# Patient Record
Sex: FEMALE | Race: WHITE
[De-identification: names, ages, dates, MRNs, and addresses within clinical notes are randomized per-mention and may not be internally consistent; named-entity substitution may affect disease eponyms.]

---

## 2017-07-02 ENCOUNTER — HOSPITAL ENCOUNTER (EMERGENCY)
Dept: HOSPITAL 47 - EC | Age: 77
Discharge: HOME | End: 2017-07-02
Payer: COMMERCIAL

## 2017-07-02 VITALS — SYSTOLIC BLOOD PRESSURE: 162 MMHG | RESPIRATION RATE: 18 BRPM | HEART RATE: 60 BPM | DIASTOLIC BLOOD PRESSURE: 71 MMHG

## 2017-07-02 VITALS — TEMPERATURE: 97.5 F

## 2017-07-02 DIAGNOSIS — Z88.1: ICD-10-CM

## 2017-07-02 DIAGNOSIS — I10: ICD-10-CM
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
## 2017-07-02 DIAGNOSIS — Z79.899: ICD-10-CM

## 2017-07-02 DIAGNOSIS — Z23: ICD-10-CM

## 2017-07-02 DIAGNOSIS — E78.5: ICD-10-CM
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
## 2017-07-02 DIAGNOSIS — S61.212A: Primary | ICD-10-CM

## 2017-07-02 PROCEDURE — 90715 TDAP VACCINE 7 YRS/> IM: CPT

## 2017-07-02 PROCEDURE — 90471 IMMUNIZATION ADMIN: CPT

## 2017-07-02 PROCEDURE — 99282 EMERGENCY DEPT VISIT SF MDM: CPT

## 2017-07-02 PROCEDURE — 12001 RPR S/N/AX/GEN/TRNK 2.5CM/<: CPT

## 2017-07-02 NOTE — ED
Wound/Laceration HPI





- General


Chief Complaint: Wound/Laceration


Stated Complaint: right middle finger laceration


Time Seen by Provider: 07/02/17 12:12


Source: patient


Mode of arrival: ambulatory


Limitations: no limitations





- History of Present Illness


Initial Comments: 





77-year-old female presents with right middle finger injury.  Patient states 

she was rolling up a car window and cut the tip of her finger stuck.  She is 

complaining of a laceration to finger.  The window did not break and there is 

no concern for foreign body.  Patient is uncertain of her tetanus status.  

Patient has no other complaints.





- Related Data


 Home Medications











 Medication  Instructions  Recorded  Confirmed


 


Atorvastatin Calcium [Atorvastatin 20 tab PO HS 03/21/16 07/02/17





Calcium]   


 


Cholecalciferol [Vitamin D3] 1,000 unit PO DAILY 03/21/16 07/02/17


 


Cranberry Fruit Extract [Cranberry] 500 mg PO DAILY 03/21/16 07/02/17


 


Enalapril Maleate [Enalapril 10 mg PO BID 03/21/16 07/02/17





Maleate]   


 


Metoprolol Succinate [Metoprolol 12.5 mg PO DAILY 03/21/16 07/02/17





Succinate]   


 


Multivitamins, Thera [Multivitamin] 1 tab PO DAILY 03/21/16 07/02/17


 


Vitamin B Complex 1 cap PO DAILY 03/21/16 07/02/17











 Allergies











Allergy/AdvReac Type Severity Reaction Status Date / Time


 


ciprofloxacin [From Cipro] Allergy  Rash/Hives Verified 07/02/17 12:27


 


ciprofloxacin HCl Allergy  Rash/Hives Verified 07/02/17 12:27





[From Cipro]     














Review of Systems


ROS Statement: 


Those systems with pertinent positive or pertinent negative responses have been 

documented in the HPI.





ROS Other: All systems not noted in ROS Statement are negative.





Past Medical History


Past Medical History: Hyperlipidemia, Hypertension


History of Any Multi-Drug Resistant Organisms: None Reported


Past Surgical History: Cholecystectomy, Hysterectomy, Orthopedic Surgery, 

Tonsillectomy


Past Psychological History: No Psychological Hx Reported


Smoking Status: Never smoker


Past Alcohol Use History: None Reported


Past Drug Use History: None Reported





General Exam


Limitations: no limitations


General appearance: alert, in no apparent distress


Head exam: Present: atraumatic, normocephalic


Eye exam: Present: normal appearance, PERRL


Respiratory exam: Present: normal lung sounds bilaterally, respiratory distress


Cardiovascular Exam: Present: regular rate, normal rhythm


GI/Abdominal exam: Present: soft.  Absent: distended, tenderness


Extremities exam: Present: other (Patient is a 1.5 cm laceration over the 

distal phalanx of the right third digit.  This is on the palmar surface.  No 

gross deformity.)





Course


 Vital Signs











  07/02/17 07/02/17





  11:41 13:15


 


Temperature 97.5 F L 97.5 F L


 


Pulse Rate 65 60


 


Respiratory 20 18





Rate  


 


Blood Pressure 155/70 162/71


 


O2 Sat by Pulse 98 100





Oximetry  














Procedures





- Laceration


  ** Laceration #1


Consent Obtained: verbal consent


Time Out Performed: Yes


Indication: laceration


Site: hand


Description: linear


Anesthetic Used: lidocaine 1%


Pre-repair: wound explored, irrigated extensively


Type of Sutures: nylon


Size of Sutures: 5-0


Technique: simple, interrupted


Patient Tolerated Procedure: well (4, 5-0 nylon sutures are placed.  Patient 

tolerated the procedure well.)





Medical Decision Making





- Medical Decision Making





69-year-old female presenting with right middle finger injury.  Patient does 

have a laceration over the distal phalanx on the palmar surface.  This is 

anesthetized, irrigated, and repaired with 5-0 nylon suture.  Patient is 

instructed to return with signs of infection.  She'll follow-up with her 

primary care physician for both evaluation and for suture removal in 

approximately 10 days.





Disposition


Clinical Impression: 


 Laceration





Disposition: HOME SELF-CARE


Condition: Good


Instructions:  Laceration (ED)


Additional Instructions: 


Patient will follow-up with primary care physician for suture removal in 

approximately 10 days.  Patient will return to emergency department with signs 

of infection.


Referrals: 


Mitchell Lovlel MD [Primary Care Provider] - 1-2 days


Time of Disposition: 13:15

## 2018-03-15 ENCOUNTER — HOSPITAL ENCOUNTER (OUTPATIENT)
Dept: HOSPITAL 47 - RADBDWWP | Age: 78
Discharge: HOME | End: 2018-03-15
Payer: MEDICARE

## 2018-03-15 DIAGNOSIS — Z78.0: Primary | ICD-10-CM

## 2018-03-15 DIAGNOSIS — M81.0: ICD-10-CM

## 2018-03-15 PROCEDURE — 77080 DXA BONE DENSITY AXIAL: CPT

## 2018-03-15 NOTE — BD
EXAMINATION TYPE: MG DEXA axial skeleton.  

 

DATE OF EXAM: 3/15/2018

 

 

CLINICAL HISTORY: 

 

Height:  65 inches

Weight:  193

 

FRAX RISK QUESTIONS:

Alcohol (3 or more units per day):  no

Family History (Parent hip fracture):  no

Glucocorticoids (More than 3mos):  no

           (Ex: prednisone, prednisolone, methylprednisolone, dexamethasone, and hydrocortisone).    
     

History of Fracture in Adulthood: no

Secondary Osteoporosis:

  1.  Type 1 Diabetes: no type II

  2.  Hyperthyroidism: no

  3.  Menopause before 45: hysterectomy age 32, but states menopause age 49

  4.  Malnutrition: unsure

  5.  Chronic liver disease: no

Rheumatoid Arthritis: no

Current Tobacco Use: no

 

RISK FACTORS 

HISTORY OF: 

Family History of Osteoporosis: yes

Active: somewhat

Diet low in dairy products/other sources of calcium:  no

Postmenopausal woman: yes

Take estrogen and/or progesterone medications: no

Lost more than 2 inches in height since high school: yes

Frequent falls: no, but slightly unsteady at times

Poor Health: somewhat

Hyperparathyroidism: no

Adrenal Insufficiency: no

 

MEDICATIONS: 

Prednisone or other steroids: no

Thyroid Medications: no 

Osteoporosis Medications: no

Additional Medications: oral diabetes meds,  blood pressure meds, multivitamin, Enalaprin, metoprolol
, Januvia, Pravastatin, Furosemide, aspirin, iron, B complex, glucosamine chondroitin 

 

 

Additional History: knee replacement; cervical CA

 

 

EXAM MEASUREMENTS: 

Bone mineral densitometry was performed using the [employer] System.

Bone mineral density as measured about the Lumbar spine is:  

----- L1-L4(G/cm2): 1.349

T Score Values are as follows:

----- L2: 1.3

----- L3: 1.7

----- L4: 0.6

----- L1-L4: 1.4

Bone mineral density has: Increased 6.8% since study of: 02/07/2013

 

Bone mineral density about the R hip (g/cm2): 0.684

Bone mineral density about the L hip (g/cm2): 0.683

T Score values are as follows:

-----R Neck: -2.5

-----L Neck: -2.6

-----R Total: -2.4

-----L Total: -2.0

Bone mineral density has: decreased  -4.7% since study of: 02/07/2013

 

 

IMPRESSION:

Osteoporosis (T Score less than -2.5).

 

There is increased fracture risk and therapy is usually indicated based on age.

 

Re-Screen 1-2 years.

 

 

 

 

 

NOTE:  T-SCORE=SD OF THE YOUNG ADULT MEAN.

## 2018-04-13 ENCOUNTER — HOSPITAL ENCOUNTER (OUTPATIENT)
Dept: HOSPITAL 47 - ORWHC2ENDO | Age: 78
End: 2018-04-13
Payer: COMMERCIAL

## 2018-04-13 VITALS — SYSTOLIC BLOOD PRESSURE: 135 MMHG | DIASTOLIC BLOOD PRESSURE: 62 MMHG | HEART RATE: 57 BPM | RESPIRATION RATE: 16 BRPM

## 2018-04-13 VITALS — TEMPERATURE: 97.5 F

## 2018-04-13 VITALS — BODY MASS INDEX: 31.9 KG/M2

## 2018-04-13 DIAGNOSIS — Z79.899: ICD-10-CM

## 2018-04-13 DIAGNOSIS — E11.9: ICD-10-CM

## 2018-04-13 DIAGNOSIS — Z79.82: ICD-10-CM

## 2018-04-13 DIAGNOSIS — K63.3: ICD-10-CM

## 2018-04-13 DIAGNOSIS — I10: ICD-10-CM

## 2018-04-13 DIAGNOSIS — C19: Primary | ICD-10-CM

## 2018-04-13 DIAGNOSIS — Z79.84: ICD-10-CM

## 2018-04-13 DIAGNOSIS — E78.5: ICD-10-CM

## 2018-04-13 DIAGNOSIS — Z88.1: ICD-10-CM

## 2018-04-13 LAB — GLUCOSE BLD-MCNC: 131 MG/DL (ref 75–99)

## 2018-04-13 PROCEDURE — 45380 COLONOSCOPY AND BIOPSY: CPT

## 2018-04-13 PROCEDURE — 88305 TISSUE EXAM BY PATHOLOGIST: CPT

## 2018-04-13 PROCEDURE — 45331 SIGMOIDOSCOPY AND BIOPSY: CPT

## 2018-04-13 NOTE — P.PCN
Date of Procedure: 04/13/18


Procedure(s) Performed: 


BRIEF HISTORY: Patient is a 78-year-old pleasant white female, scheduled for an 

elective colonoscopy as a part of intermittent rectal bleeding for the last 6 

months duration.  She also has prior history of colon polyps.





PROCEDURE PERFORMED: Attempted colonoscopy with biopsies  





PREOPERATIVE DIAGNOSIS: Rectal bleeding of 6 months duration. 





IV sedation per Anesthesia. 





PROCEDURE: After informed consent was obtained, the patient, was brought into 

the endoscopy unit. IV sedation was administered by Anesthesia under continuous 

monitoring.  Digital rectal examination was normal. Initially the Olympus CF-

160 flexible video colonoscope was then inserted in the rectum, gradually 

advanced into the  descending colon, and upon advancement was not possible 

because of extremely poor prep and extensive sigmoid diverticula cyst.  At this 

time the scope was gradually being withdrawn.  Most of the descending colon 

could not be visualized because of poor prep and solid stool in this area.  The 

sigmoid colon appeared normal.  In the rectosigmoid colon at 12 cm from the 

anal verge there was a semicircumferential circumferential ulcerated mass 

identified which was extending at least 5 cm proximally and multiple biopsies 

were done from this area.  The rectum appeared normal.  Retroflexion was 

performed in the rectum and no lesions were seen. The patient tolerated the 

procedure well. 





IMPRESSION: 


Ulcerated semicircumferential rectosigmoid mass extending from 12-17 cm from 

the anal verge status post multiple biopsies


Scope could not be advanced beyond the descending colon because of poor prep 

and solid stool in this area.


Sigmoid area to closest.





RECOMMENDATIONS:  Findings of this examination were discussed with the patient 

as well as a family.  At this time will await the biopsy results.  CT of the 

abdomen and pelvis will be scheduled and she'll be seen in the office next week.

## 2018-04-16 ENCOUNTER — HOSPITAL ENCOUNTER (OUTPATIENT)
Dept: HOSPITAL 47 - RADCTMAIN | Age: 78
Discharge: HOME | End: 2018-04-16
Payer: COMMERCIAL

## 2018-04-16 DIAGNOSIS — K57.30: ICD-10-CM

## 2018-04-16 DIAGNOSIS — K56.41: ICD-10-CM

## 2018-04-16 DIAGNOSIS — C18.7: Primary | ICD-10-CM

## 2018-04-16 LAB — BUN SERPL-SCNC: 25 MG/DL (ref 7–17)

## 2018-04-16 PROCEDURE — 82565 ASSAY OF CREATININE: CPT

## 2018-04-16 PROCEDURE — 74177 CT ABD & PELVIS W/CONTRAST: CPT

## 2018-04-16 PROCEDURE — 84520 ASSAY OF UREA NITROGEN: CPT

## 2018-04-16 NOTE — CT
EXAMINATION TYPE: CT abdomen pelvis w con

 

DATE OF EXAM: 4/16/2018

 

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 Symptoms of bleeding and
 constipation for 6 to 9 months per patient.

 

CT DLP: 1468.8mGycm  Automated Exposure Control for Dose Reduction was Utilized.

 

CONTRAST: 

CT scan of the abdomen and pelvis is performed with IV Contrast, patient injected with 80 mL of Isovu
e 300.

 

COMPARISON: CT abdomen and pelvis August 30, 2009.

 

FINDINGS:

 

LUNG BASES: There is 4 mm calcified nodule medial right lung base redemonstrated.

 

LIVER/GB: Cholecystectomy clips are again seen.

 

PANCREAS: No significant abnormality is seen.

 

SPLEEN: A 1.9 cm splenule in splenic hilum is slightly larger versus prior.

 

ADRENALS: Slight asymmetric nodular thickening to left adrenal gland is stable favoring benign.

 

KIDNEYS: No significant abnormality is seen.

 

BOWEL: The oral contrast does not reach colonic level making evaluation slightly suboptimal. There is
 no suspicious small or large bowel dilatation. There is some prominence of fecal material throughout
 the colon. There is prominent diverticulosis throughout the colon including throughout redundant sig
moid colon. There is suspicious moderate wall thickening in the distal sigmoid colon including sigmoi
d rectal junction seen best coronal image 66 likely reflecting site of recently diagnosed neoplasm. T
here is mild wall thickening of the right colon including at level of hepatic flexure in which a mild
 underlying colitis cannot be excluded.

 

UTERUS/ADNEXA: Uterus is surgically absent or less likely markedly atrophic unchanged from prior. Sma
ll amount of free fluid left pelvis axial image 66 is noted

 

LYMPH NODES: No greater than 1cm abdominal or pelvic lymph nodes are appreciated.

 

OSSEOUS STRUCTURES: Underlying levoconvex scoliosis is seen. There is moderate to severe multilevel a
nterior and lateral spurring. There is moderate to severe multilevel disc space narrowing and vacuum 
disc phenomenon in the upper to mid lumbar spine. There is moderate to severe axial joint space loss 
in both hips. There is prominent facet arthropathy lower lumbar levels.

 

OTHER: There is moderate calcified plaque of the aorta extending into branch vessels.

 

IMPRESSION: Suboptimal study, primary neoplasm sigmoid rectal junction is felt identified. No metasta
tic disease is evident. Prominent diffuse colonic diverticulosis is noted. There is mild to moderate 
diffuse colonic fecal stasis. There is possible mild right-sided colitis, correlate clinically.

## 2018-06-22 ENCOUNTER — HOSPITAL ENCOUNTER (INPATIENT)
Dept: HOSPITAL 47 - EC | Age: 78
LOS: 11 days | Discharge: NURSING, REHAB OR LONG-TERM CARE | DRG: 393 | End: 2018-07-03
Payer: MEDICARE

## 2018-06-22 VITALS — BODY MASS INDEX: 32.1 KG/M2

## 2018-06-22 DIAGNOSIS — Z79.899: ICD-10-CM

## 2018-06-22 DIAGNOSIS — E46: ICD-10-CM

## 2018-06-22 DIAGNOSIS — I25.10: ICD-10-CM

## 2018-06-22 DIAGNOSIS — Z96.651: ICD-10-CM

## 2018-06-22 DIAGNOSIS — R53.81: ICD-10-CM

## 2018-06-22 DIAGNOSIS — I35.0: ICD-10-CM

## 2018-06-22 DIAGNOSIS — Z82.49: ICD-10-CM

## 2018-06-22 DIAGNOSIS — E73.9: ICD-10-CM

## 2018-06-22 DIAGNOSIS — E87.2: ICD-10-CM

## 2018-06-22 DIAGNOSIS — C20: ICD-10-CM

## 2018-06-22 DIAGNOSIS — E87.8: ICD-10-CM

## 2018-06-22 DIAGNOSIS — Z90.710: ICD-10-CM

## 2018-06-22 DIAGNOSIS — I21.4: ICD-10-CM

## 2018-06-22 DIAGNOSIS — T45.1X5A: ICD-10-CM

## 2018-06-22 DIAGNOSIS — Z90.49: ICD-10-CM

## 2018-06-22 DIAGNOSIS — Z85.41: ICD-10-CM

## 2018-06-22 DIAGNOSIS — E66.9: ICD-10-CM

## 2018-06-22 DIAGNOSIS — I10: ICD-10-CM

## 2018-06-22 DIAGNOSIS — Z79.84: ICD-10-CM

## 2018-06-22 DIAGNOSIS — N39.0: ICD-10-CM
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
## 2018-06-22 DIAGNOSIS — D61.810: ICD-10-CM

## 2018-06-22 DIAGNOSIS — K52.1: Primary | ICD-10-CM

## 2018-06-22 DIAGNOSIS — E86.0: ICD-10-CM

## 2018-06-22 DIAGNOSIS — K12.32: ICD-10-CM

## 2018-06-22 DIAGNOSIS — Z88.1: ICD-10-CM

## 2018-06-22 DIAGNOSIS — L27.1: ICD-10-CM

## 2018-06-22 DIAGNOSIS — E11.9: ICD-10-CM

## 2018-06-22 DIAGNOSIS — I65.21: ICD-10-CM

## 2018-06-22 DIAGNOSIS — Z87.440: ICD-10-CM

## 2018-06-22 DIAGNOSIS — E78.5: ICD-10-CM

## 2018-06-22 DIAGNOSIS — M81.0: ICD-10-CM

## 2018-06-22 DIAGNOSIS — B96.1: ICD-10-CM

## 2018-06-22 LAB
ALBUMIN SERPL-MCNC: 3.4 G/DL (ref 3.5–5)
ALP SERPL-CCNC: 63 U/L (ref 38–126)
ALT SERPL-CCNC: 24 U/L (ref 9–52)
ANION GAP SERPL CALC-SCNC: 13 MMOL/L
APTT BLD: 20.8 SEC (ref 22–30)
AST SERPL-CCNC: 30 U/L (ref 14–36)
BASOPHILS # BLD AUTO: 0 K/UL (ref 0–0.2)
BASOPHILS NFR BLD AUTO: 0 %
BUN SERPL-SCNC: 30 MG/DL (ref 7–17)
CALCIUM SPEC-MCNC: 8.6 MG/DL (ref 8.4–10.2)
CHLORIDE SERPL-SCNC: 105 MMOL/L (ref 98–107)
CO2 SERPL-SCNC: 18 MMOL/L (ref 22–30)
EOSINOPHIL # BLD AUTO: 0.1 K/UL (ref 0–0.7)
EOSINOPHIL NFR BLD AUTO: 2 %
ERYTHROCYTE [DISTWIDTH] IN BLOOD BY AUTOMATED COUNT: 3.81 M/UL (ref 3.8–5.4)
ERYTHROCYTE [DISTWIDTH] IN BLOOD: 18.6 % (ref 11.5–15.5)
GLUCOSE BLD-MCNC: 114 MG/DL (ref 75–99)
GLUCOSE SERPL-MCNC: 159 MG/DL (ref 74–99)
HCT VFR BLD AUTO: 36.1 % (ref 34–46)
HGB BLD-MCNC: 11.4 GM/DL (ref 11.4–16)
INR PPP: 1 (ref ?–1.2)
LIPASE SERPL-CCNC: 145 U/L (ref 23–300)
LYMPHOCYTES # SPEC AUTO: 0.1 K/UL (ref 1–4.8)
LYMPHOCYTES NFR SPEC AUTO: 3 %
MCH RBC QN AUTO: 30.1 PG (ref 25–35)
MCHC RBC AUTO-ENTMCNC: 31.7 G/DL (ref 31–37)
MCV RBC AUTO: 94.8 FL (ref 80–100)
MONOCYTES # BLD AUTO: 0.2 K/UL (ref 0–1)
MONOCYTES NFR BLD AUTO: 5 %
NEUTROPHILS # BLD AUTO: 4.5 K/UL (ref 1.3–7.7)
NEUTROPHILS NFR BLD AUTO: 90 %
PLATELET # BLD AUTO: 175 K/UL (ref 150–450)
POTASSIUM SERPL-SCNC: 4.5 MMOL/L (ref 3.5–5.1)
PROT SERPL-MCNC: 5.7 G/DL (ref 6.3–8.2)
PT BLD: 9.9 SEC (ref 9–12)
SODIUM SERPL-SCNC: 136 MMOL/L (ref 137–145)
WBC # BLD AUTO: 4.9 K/UL (ref 3.8–10.6)

## 2018-06-22 PROCEDURE — 36415 COLL VENOUS BLD VENIPUNCTURE: CPT

## 2018-06-22 PROCEDURE — 87177 OVA AND PARASITES SMEARS: CPT

## 2018-06-22 PROCEDURE — 96374 THER/PROPH/DIAG INJ IV PUSH: CPT

## 2018-06-22 PROCEDURE — 74021 RADEX ABDOMEN 3+ VIEWS: CPT

## 2018-06-22 PROCEDURE — 85610 PROTHROMBIN TIME: CPT

## 2018-06-22 PROCEDURE — 82728 ASSAY OF FERRITIN: CPT

## 2018-06-22 PROCEDURE — 96361 HYDRATE IV INFUSION ADD-ON: CPT

## 2018-06-22 PROCEDURE — 83690 ASSAY OF LIPASE: CPT

## 2018-06-22 PROCEDURE — 87186 SC STD MICRODIL/AGAR DIL: CPT

## 2018-06-22 PROCEDURE — 87329 GIARDIA AG IA: CPT

## 2018-06-22 PROCEDURE — 83630 LACTOFERRIN FECAL (QUAL): CPT

## 2018-06-22 PROCEDURE — 71046 X-RAY EXAM CHEST 2 VIEWS: CPT

## 2018-06-22 PROCEDURE — 83605 ASSAY OF LACTIC ACID: CPT

## 2018-06-22 PROCEDURE — 83036 HEMOGLOBIN GLYCOSYLATED A1C: CPT

## 2018-06-22 PROCEDURE — 80074 ACUTE HEPATITIS PANEL: CPT

## 2018-06-22 PROCEDURE — 87045 FECES CULTURE AEROBIC BACT: CPT

## 2018-06-22 PROCEDURE — 84484 ASSAY OF TROPONIN QUANT: CPT

## 2018-06-22 PROCEDURE — 83735 ASSAY OF MAGNESIUM: CPT

## 2018-06-22 PROCEDURE — 87209 SMEAR COMPLEX STAIN: CPT

## 2018-06-22 PROCEDURE — 83550 IRON BINDING TEST: CPT

## 2018-06-22 PROCEDURE — 82553 CREATINE MB FRACTION: CPT

## 2018-06-22 PROCEDURE — 85730 THROMBOPLASTIN TIME PARTIAL: CPT

## 2018-06-22 PROCEDURE — 93880 EXTRACRANIAL BILAT STUDY: CPT

## 2018-06-22 PROCEDURE — 87086 URINE CULTURE/COLONY COUNT: CPT

## 2018-06-22 PROCEDURE — 80048 BASIC METABOLIC PNL TOTAL CA: CPT

## 2018-06-22 PROCEDURE — 87046 STOOL CULTR AEROBIC BACT EA: CPT

## 2018-06-22 PROCEDURE — 87207 SMEAR SPECIAL STAIN: CPT

## 2018-06-22 PROCEDURE — 81001 URINALYSIS AUTO W/SCOPE: CPT

## 2018-06-22 PROCEDURE — 87077 CULTURE AEROBIC IDENTIFY: CPT

## 2018-06-22 PROCEDURE — 83540 ASSAY OF IRON: CPT

## 2018-06-22 PROCEDURE — 80053 COMPREHEN METABOLIC PANEL: CPT

## 2018-06-22 PROCEDURE — 83880 ASSAY OF NATRIURETIC PEPTIDE: CPT

## 2018-06-22 PROCEDURE — 85025 COMPLETE CBC W/AUTO DIFF WBC: CPT

## 2018-06-22 PROCEDURE — 74176 CT ABD & PELVIS W/O CONTRAST: CPT

## 2018-06-22 PROCEDURE — 85045 AUTOMATED RETICULOCYTE COUNT: CPT

## 2018-06-22 PROCEDURE — 74022 RADEX COMPL AQT ABD SERIES: CPT

## 2018-06-22 PROCEDURE — 87324 CLOSTRIDIUM AG IA: CPT

## 2018-06-22 PROCEDURE — 93005 ELECTROCARDIOGRAM TRACING: CPT

## 2018-06-22 PROCEDURE — 99285 EMERGENCY DEPT VISIT HI MDM: CPT

## 2018-06-22 PROCEDURE — 82550 ASSAY OF CK (CPK): CPT

## 2018-06-22 RX ADMIN — CEFAZOLIN SCH MLS/HR: 330 INJECTION, POWDER, FOR SOLUTION INTRAMUSCULAR; INTRAVENOUS at 22:50

## 2018-06-22 RX ADMIN — ACETAMINOPHEN SCH ML: 160 SOLUTION ORAL at 22:50

## 2018-06-22 RX ADMIN — PRAVASTATIN SODIUM SCH MG: 40 TABLET ORAL at 22:50

## 2018-06-22 RX ADMIN — NITROFURANTOIN (MONOHYDRATE/MACROCRYSTALS) SCH MG: 75; 25 CAPSULE ORAL at 22:50

## 2018-06-22 NOTE — US
EXAMINATION TYPE: US carotid duplex BILAT

 

DATE OF EXAM: 6/22/2018

 

COMPARISON: NONE

 

CLINICAL HISTORY: per Maulik ORTA. carotid stenosis, patient states she is followed every six months at 
Dr's office.

 

EXAM MEASUREMENTS: 

 

RIGHT:  Peak Systolic Velocity (PSV) cm/sec

----- Right CCA:  66.2  

----- Right ICA:  268.0**     

----- Right ECA:  199.6   

ICA/CCA ratio:  4.0**

    

 

RIGHT:  End Diastole cm/sec

----- Right CCA:  7.8   

----- Right ICA:  19.1      

----- Right ECA:  0.0     

 

LEFT:  Peak Systolic Velocity (PSV) cm/sec

----- Left CCA:  74.9  

----- Left ICA:  98.2   

----- Left ECA:  108.1  

ICA/CCA ratio:  1.3  

 

LEFT:  End Diastole cm/sec

----- Left CCA:  7.6  

----- Left ICA:  14.1   

----- Left ECA:  0.0 

 

VERTEBRALS (direction of flow):

Right Vertebral: Antegrade

Left Vertebral: Antegrade

 

Rhythm:  Arrhythmia

 

Elevated right ICA velocity causing elevated ratio. Extensive plaque noted.

 

 

 

IMPRESSION:  There is antegrade flow in the vertebral arteries.

 

The images and measurements suggest more than 80% stenosis in the right internal carotid artery and m
ore than 50% stenosis in the left internal carotid artery.   

 

 

Criteria for Assigning % of Stenosis / Diameter reduction

(Estimation based on the indirect measurements of the internal carotid artery velocities (ICA PSV).

1.  Normal (no stenosis)=ICA PSV < 125 cm/s: ratio < 2.0: ICA EDV<40 cm/s.

2. Less than 50% stenosis=ICA PSV < 125 cm/s: ratio < 2.0: ICA EDV<40 cm/s.

3.  50 to 69% stenosis=ICA PSV of 125 to 230 cm/s: ration 2.0 ? 4.0: ICA EDV  cm/s.

4.  Greater than 70% stenosis to near occlusion= ICA PSV > 230 cm/s: ratio > 4.0: ICA EDV > 100 cm/s.
 

5.  Near occlusion= ICA PSV velocities may be low or undetectable: variable ratio and ICA EDV.

6.  Total occlusion=unable to detect flow.

## 2018-06-22 NOTE — XR
EXAMINATION TYPE: XR chest 2V

 

DATE OF EXAM: 6/22/2018

 

COMPARISON: NONE

 

HISTORY: Chest pain

 

TECHNIQUE:  Frontal and lateral views of the chest are obtained.

 

FINDINGS:  There is no heart failure nor confluent pneumonic infiltrate. Costophrenic angles are twila
r. There are chest leads. Bony thorax is intact.

 

IMPRESSION:  No active cardiopulmonary disease. Normal heart.

## 2018-06-22 NOTE — HP
HISTORY AND PHYSICAL



DATE OF SERVICE:

06/22/2018



CHIEF COMPLAINTS:

Diarrhea, weakness and elevated troponin.



HISTORY OF PRESENT ILLNESS:

This 78-year-old woman with a past medical history of multiple medical problems,

including diabetes mellitus, hypertension, hyperlipidemia, being followed by Dr. Lovell in the outpatient setting, also has rectal cancer.  The patient is receiving

chemotherapy from Dr. Balderrama.  The patient complains of diarrhea and also complains of

weakness and tiredness. Patient came to [facility].  The patient also had a

carotid stenosis previously.  The patient was found to have dehydration as well.

Troponin is elevated at 0.039.  Patient is admitted for further evaluation and

treatment.  There is no history of chest pain per se.  The EKG is not available at this

time.



PAST MEDICAL HISTORY:

1. History of rectal cancer.

2. Diabetes mellitus.

3. Hypertension.

4. Hyperlipidemia.

5. Osteoporosis.

6. Cholecystectomy.



HOME MEDICATIONS:

1. Compazine 10 mg daily p.r.n.

2. Zofran 4 mg q.8 p.r.n.

3. Januvia 100 mg p.o. daily.

4. Pravachol 40 mg at bedtime.

5. Metoprolol 12.5 mg each morning.

6. Enalapril 10 mg p.o. b.i.d.

7. Macrobid 100 mg p.o. b.i.d.



ALLERGIES:

CIPRO.



FAMILY HISTORY:

No history of heart disease or strokes in the family.



SOCIAL HISTORY:

No history of smoking.  No history of alcohol intake.



REVIEW OF SYSTEMS:

ENT: No diminished hearing. No diminished vision.

CARDIOVASCULAR SYSTEM: No angina, palpitations.

RESPIRATORY SYSTEM: As mentioned earlier.

GI: No nausea, vomiting.

: No dysuria or retention.

NERVOUS SYSTEM: No numbness, weakness.

ALLERGY/IMMUNOLOGY: No asthma, hayfever.

MUSCULOSKELETAL: As mentioned earlier.

HEMATOLOGY/ONCOLOGY: As mentioned earlier.

ENDOCRINE: Diabetes.

CONSTITUTIONAL: As mentioned earlier.

DERMATOLOGY: Negative.

RHEUMATOLOGY: Negative.

PSYCHIATRY: As mentioned earlier.



PHYSICAL EXAMINATION:

Patient is alert and oriented x3. Pulse 71, blood pressure 150/67, respiration 18,

temperature 98.2, pulse ox 97% on 2 L.

HEENT: Conjunctivae normal. Oral mucosa dry.

NECK: No jugular venous distention. No carotid bruit. No lymph node enlargement.

CARDIOVASCULAR SYSTEM: S1, S2 muffled. No S3. No S4.

RESPIRATORY SYSTEM: Breath sounds diminished at the bases. No rhonchi. No crackles.

ABDOMEN: Soft. Mild diffuse discomfort. No guarding.  No rigidity.  No mass palpable.

LEGS: No edema. No swelling.

NERVOUS SYSTEM: Higher functions as mentioned earlier. Moves all 4 limbs. No focal

motor or sensory deficit.

LYMPHATICS: No lymph node palpable in neck, axillae or groin.

SKIN: No ulcer, rash, bleeding.



LABS:

CBC within normal limits. Sodium is 136. Glucose 159. Troponin 0.039.



ASSESSMENT:

1. Diarrhea, weakness, dehydration.

2. Troponin 0.039; possible acute non-ST-segment-elevation myocardial infarction.

3. Rectal cancer, on treatment.

4. Diabetes mellitus, type 2.

5. Hypertension.

6. Hyperlipidemia.

7. Osteoporosis.

8. History of cholecystectomy.

9. FULL CODE.



RECOMMENDATIONS AND DISCUSSION:

In this 78-year-old woman who presented with multiple complex medical issues, we will

monitor the patient closely.  I recommend continuing the current medications,

symptomatic treatment.  Otherwise, cautious IV fluids. Cardiology consultation.  Will

consult Dr. Balderrama also.  EKG.  Resume the home medications, including the beta

blockers. Antiplatelet agents. Guarded prognosis because of multiple complex medical

issues. Further recommendations to follow. We will hold off the heparin at this time

because of history of rectal cancer as well as diarrhea. Prognosis guarded.  Discussed

with the patient and family, who understand and agree. A copy of this dictation is

being forwarded to Dr. Lovell, who is the primary physician.





MMODL / IJN: [id number] / Job#: [id number]

## 2018-06-22 NOTE — ED
Nausea/Vomiting/Diarrhea HPI





- General


Chief complaint: Nausea/Vomiting/Diarrhea


Stated complaint: Cancer Pt


Time Seen by Provider: 06/22/18 15:45


Source: patient


Mode of arrival: wheelchair


Limitations: no limitations





- History of Present Illness


Initial comments: 


78-year-old  female with past medical history as noted below presented 

for evaluation of nausea vomiting diarrhea and rashes.  She states that she 

just started radiation for rectal cancer about a week ago and since then has 

been developing rash across her hands feet and in her mouth.  States the sores 

in her mouth are difficult to control making eating a problem.  She further 

states that she has been having nausea and vomiting over the last week but 

acutely worsening over the last 2-3 days.  She has also been having diarrhea 

which she believes is making her further dehydrated as she cannot keep anything 

down.  Denies any associated abdominal pain that is out of the ordinary for her

, chest pain, shortness breath, fevers, chills.  She has been following up with 

both her PCP and her oncologist but her symptoms continue to worsen.





- Related Data


 Home Medications











 Medication  Instructions  Recorded  Confirmed


 


Enalapril Maleate [Enalapril 10 mg PO BID 03/21/16 06/22/18





Maleate]   


 


Metoprolol Succinate [Metoprolol 12.5 mg PO QAM 03/21/16 06/22/18





Succinate]   


 


sitaGLIPtin [Januvia] 100 mg PO DAILY 03/20/18 06/22/18


 


Pravastatin Sodium [Pravachol] 40 mg PO HS 04/13/18 06/22/18


 


Nitrofurantoin Monohyd/M-Cryst 100 mg PO Q12HR 06/22/18 06/22/18





[Macrobid]   


 


Ondansetron [Zofran ODT] 4 mg PO Q8HR PRN 06/22/18 06/22/18


 


Prochlorperazine [Compazine] 10 mg PO DAILY PRN 06/22/18 06/22/18











 Allergies











Allergy/AdvReac Type Severity Reaction Status Date / Time


 


ciprofloxacin [From Cipro] AdvReac  Blackout Verified 06/22/18 16:17


 


ciprofloxacin HCl AdvReac  Blackout Verified 06/22/18 16:17





[From Cipro]     














Review of Systems


ROS Statement: 


Those systems with pertinent positive or pertinent negative responses have been 

documented in the HPI.





ROS Other: All systems not noted in ROS Statement are negative.


Constitutional: Denies: fever, chills, night sweats


Eyes: Denies: eye pain, eye discharge, vision change


ENT: Reports: other (Mouth sores).  Denies: ear pain, throat pain


Respiratory: Denies: cough, dyspnea


Cardiovascular: Denies: chest pain, palpitations


Endocrine: Reports: fatigue.  Denies: polydipsia, polyuria


Gastrointestinal: Reports: abdominal pain (At baseline), nausea, vomiting


Genitourinary: Denies: urgency, dysuria


Musculoskeletal: Denies: back pain, arthralgia, myalgia


Skin: Reports: lesions, change in color.  Denies: rash


Neurological: Denies: headache, weakness


Psychiatric: Denies: anxiety, depression


Hematological/Lymphatic: Denies: easy bleeding, easy bruising





Past Medical History


Past Medical History: Cancer, Diabetes Mellitus, Hyperlipidemia, Hypertension


Additional Past Medical History / Comment(s): osteoporosis, rectal cancer


History of Any Multi-Drug Resistant Organisms: None Reported


Past Surgical History: Cholecystectomy, Hysterectomy, Orthopedic Surgery, 

Tonsillectomy


Past Anesthesia/Blood Transfusion Reactions: No Reported Reaction


Past Psychological History: No Psychological Hx Reported


Smoking Status: Never smoker


Past Alcohol Use History: None Reported


Past Drug Use History: None Reported





- Past Family History


  ** Mother


Family Medical History: No Reported History





  ** Father


Family Medical History: Myocardial Infarction (MI)





General Exam


Limitations: no limitations


General appearance: alert, in no apparent distress


Head exam: Present: atraumatic, normocephalic


Eye exam: Present: normal appearance, PERRL, EOMI


ENT exam: Present: normal exam, normal oropharynx


Neck exam: Present: normal inspection, tenderness


Respiratory exam: Present: normal lung sounds bilaterally.  Absent: respiratory 

distress, wheezes, rales, rhonchi, stridor


Cardiovascular Exam: Present: regular rate, normal rhythm


GI/Abdominal exam: Present: soft.  Absent: distended, tenderness, guarding, 

rebound, rigid


Rectal exam: Present: deferred


Extremities exam: Present: normal inspection, full ROM


Back exam: Present: normal inspection, full ROM


Neurological exam: Present: alert, oriented X3


Psychiatric exam: Present: normal affect, normal mood


Skin exam: Present: warm, dry, intact, other (hands and feet are erythematous 

without lesions; oral cavity shows sores scattered across the mucosa)





Course


 Vital Signs











  06/22/18 06/22/18 06/22/18





  14:39 16:43 19:00


 


Temperature 97.9 F  


 


Pulse Rate 95 81 85


 


Respiratory 20 18 18





Rate   


 


Blood Pressure 113/66 180/79 146/71


 


O2 Sat by Pulse 100 95 100





Oximetry   














  06/22/18





  20:22


 


Temperature 98.2 F


 


Pulse Rate 71


 


Respiratory 18





Rate 


 


Blood Pressure 150/67


 


O2 Sat by Pulse 97





Oximetry 














Medical Decision Making





- Medical Decision Making





78-year-old  female past medical history as noted above presented for 

evaluation of intractable nausea and vomiting and discoloration/rash to hands 

and feet and mouth.  On physical examination she does have sores in the mouth 

that are not ulcerating or opening up.  The discoloration to the hands is 

distal to the ankles and wrists and there are no open lesions there either she 

just states that they are burning.  The remainder of her physical exam is 

benign.





Labs show an elevated troponin of 0.039 otherwise no significant abnormalities.

  EKG showed above and chest x-ray shows no acute process.  The patient was 

reevaluated and had some improvement in her symptoms.  She was informed of 

results and that she be admitted for further treatment and evaluation.  The 

patient was discussed with cardiology, medicine, and oncology who agreed with 

admission and had no further requests. Admission order placed and bed request 

submitted.





- Lab Data


Result diagrams: 


 06/22/18 16:33





 06/22/18 16:33


 Lab Results











  06/22/18 06/22/18 06/22/18 Range/Units





  16:33 16:33 16:33 


 


WBC  4.9    (3.8-10.6)  k/uL


 


RBC  3.81    (3.80-5.40)  m/uL


 


Hgb  11.4    (11.4-16.0)  gm/dL


 


Hct  36.1    (34.0-46.0)  %


 


MCV  94.8    (80.0-100.0)  fL


 


MCH  30.1    (25.0-35.0)  pg


 


MCHC  31.7    (31.0-37.0)  g/dL


 


RDW  18.6 H    (11.5-15.5)  %


 


Plt Count  175    (150-450)  k/uL


 


Neutrophils %  90    %


 


Lymphocytes %  3    %


 


Monocytes %  5    %


 


Eosinophils %  2    %


 


Basophils %  0    %


 


Neutrophils #  4.5    (1.3-7.7)  k/uL


 


Lymphocytes #  0.1 L    (1.0-4.8)  k/uL


 


Monocytes #  0.2    (0-1.0)  k/uL


 


Eosinophils #  0.1    (0-0.7)  k/uL


 


Basophils #  0.0    (0-0.2)  k/uL


 


Polychromasia  Present    


 


Anisocytosis  Slight    


 


Macrocytosis  Slight    


 


PT     (9.0-12.0)  sec


 


INR     (<1.2)  


 


APTT     (22.0-30.0)  sec


 


Sodium   136 L   (137-145)  mmol/L


 


Potassium   4.5   (3.5-5.1)  mmol/L


 


Chloride   105   ()  mmol/L


 


Carbon Dioxide   18 L   (22-30)  mmol/L


 


Anion Gap   13   mmol/L


 


BUN   30 H   (7-17)  mg/dL


 


Creatinine   0.90   (0.52-1.04)  mg/dL


 


Est GFR (CKD-EPI)AfAm   71   (>60 ml/min/1.73 sqM)  


 


Est GFR (CKD-EPI)NonAf   61   (>60 ml/min/1.73 sqM)  


 


Glucose   159 H   (74-99)  mg/dL


 


Plasma Lactic Acid Aron    1.0  (0.7-2.0)  mmol/L


 


Calcium   8.6   (8.4-10.2)  mg/dL


 


Total Bilirubin   0.3   (0.2-1.3)  mg/dL


 


AST   30   (14-36)  U/L


 


ALT   24   (9-52)  U/L


 


Alkaline Phosphatase   63   ()  U/L


 


Troponin I     (0.000-0.034)  ng/mL


 


NT-Pro-B Natriuret Pep     pg/mL


 


Total Protein   5.7 L   (6.3-8.2)  g/dL


 


Albumin   3.4 L   (3.5-5.0)  g/dL


 


Lipase   145   ()  U/L














  06/22/18 06/22/18 06/22/18 Range/Units





  16:33 16:33 16:33 


 


WBC     (3.8-10.6)  k/uL


 


RBC     (3.80-5.40)  m/uL


 


Hgb     (11.4-16.0)  gm/dL


 


Hct     (34.0-46.0)  %


 


MCV     (80.0-100.0)  fL


 


MCH     (25.0-35.0)  pg


 


MCHC     (31.0-37.0)  g/dL


 


RDW     (11.5-15.5)  %


 


Plt Count     (150-450)  k/uL


 


Neutrophils %     %


 


Lymphocytes %     %


 


Monocytes %     %


 


Eosinophils %     %


 


Basophils %     %


 


Neutrophils #     (1.3-7.7)  k/uL


 


Lymphocytes #     (1.0-4.8)  k/uL


 


Monocytes #     (0-1.0)  k/uL


 


Eosinophils #     (0-0.7)  k/uL


 


Basophils #     (0-0.2)  k/uL


 


Polychromasia     


 


Anisocytosis     


 


Macrocytosis     


 


PT   9.9   (9.0-12.0)  sec


 


INR   1.0   (<1.2)  


 


APTT   20.8 L   (22.0-30.0)  sec


 


Sodium     (137-145)  mmol/L


 


Potassium     (3.5-5.1)  mmol/L


 


Chloride     ()  mmol/L


 


Carbon Dioxide     (22-30)  mmol/L


 


Anion Gap     mmol/L


 


BUN     (7-17)  mg/dL


 


Creatinine     (0.52-1.04)  mg/dL


 


Est GFR (CKD-EPI)AfAm     (>60 ml/min/1.73 sqM)  


 


Est GFR (CKD-EPI)NonAf     (>60 ml/min/1.73 sqM)  


 


Glucose     (74-99)  mg/dL


 


Plasma Lactic Acid Aron     (0.7-2.0)  mmol/L


 


Calcium     (8.4-10.2)  mg/dL


 


Total Bilirubin     (0.2-1.3)  mg/dL


 


AST     (14-36)  U/L


 


ALT     (9-52)  U/L


 


Alkaline Phosphatase     ()  U/L


 


Troponin I    0.039 H*  (0.000-0.034)  ng/mL


 


NT-Pro-B Natriuret Pep  420    pg/mL


 


Total Protein     (6.3-8.2)  g/dL


 


Albumin     (3.5-5.0)  g/dL


 


Lipase     ()  U/L














06/22/18 21:31


Sinus rhythm with premature superventricular complex is, LAD, RBBB and a 

ventricular rate of 77





Disposition


Clinical Impression: 


 NSTEMI (non-ST elevated myocardial infarction), Nausea, Diarrhea, Mucositis 

due to radiation therapy





Disposition: ADMITTED AS IP TO THIS [facility]


Decision to Admit Reason: Admit from EC


Decision Time: 18:44

## 2018-06-23 LAB
ANION GAP SERPL CALC-SCNC: 13 MMOL/L
BASOPHILS # BLD AUTO: 0 K/UL (ref 0–0.2)
BASOPHILS NFR BLD AUTO: 0 %
BUN SERPL-SCNC: 30 MG/DL (ref 7–17)
CALCIUM SPEC-MCNC: 8 MG/DL (ref 8.4–10.2)
CHLORIDE SERPL-SCNC: 106 MMOL/L (ref 98–107)
CO2 SERPL-SCNC: 18 MMOL/L (ref 22–30)
EOSINOPHIL # BLD AUTO: 0.2 K/UL (ref 0–0.7)
EOSINOPHIL NFR BLD AUTO: 8 %
ERYTHROCYTE [DISTWIDTH] IN BLOOD BY AUTOMATED COUNT: 3.6 M/UL (ref 3.8–5.4)
ERYTHROCYTE [DISTWIDTH] IN BLOOD: 18.1 % (ref 11.5–15.5)
GLUCOSE BLD-MCNC: 133 MG/DL (ref 75–99)
GLUCOSE BLD-MCNC: 138 MG/DL (ref 75–99)
GLUCOSE BLD-MCNC: 153 MG/DL (ref 75–99)
GLUCOSE BLD-MCNC: 158 MG/DL (ref 75–99)
GLUCOSE SERPL-MCNC: 124 MG/DL (ref 74–99)
HCT VFR BLD AUTO: 34.2 % (ref 34–46)
HGB BLD-MCNC: 10.8 GM/DL (ref 11.4–16)
LYMPHOCYTES # SPEC AUTO: 0.2 K/UL (ref 1–4.8)
LYMPHOCYTES NFR SPEC AUTO: 8 %
MCH RBC QN AUTO: 30.1 PG (ref 25–35)
MCHC RBC AUTO-ENTMCNC: 31.6 G/DL (ref 31–37)
MCV RBC AUTO: 95.1 FL (ref 80–100)
MONOCYTES # BLD AUTO: 0.2 K/UL (ref 0–1)
MONOCYTES NFR BLD AUTO: 6 %
NEUTROPHILS # BLD AUTO: 2.3 K/UL (ref 1.3–7.7)
NEUTROPHILS NFR BLD AUTO: 76 %
PLATELET # BLD AUTO: 166 K/UL (ref 150–450)
POTASSIUM SERPL-SCNC: 3.9 MMOL/L (ref 3.5–5.1)
SODIUM SERPL-SCNC: 137 MMOL/L (ref 137–145)
WBC # BLD AUTO: 3.1 K/UL (ref 3.8–10.6)

## 2018-06-23 RX ADMIN — CEFAZOLIN SCH MLS/HR: 330 INJECTION, POWDER, FOR SOLUTION INTRAMUSCULAR; INTRAVENOUS at 08:23

## 2018-06-23 RX ADMIN — METOPROLOL SUCCINATE SCH MG: 25 TABLET, EXTENDED RELEASE ORAL at 08:46

## 2018-06-23 RX ADMIN — PANTOPRAZOLE SODIUM SCH MG: 40 TABLET, DELAYED RELEASE ORAL at 07:04

## 2018-06-23 RX ADMIN — ACETAMINOPHEN SCH ML: 160 SOLUTION ORAL at 21:07

## 2018-06-23 RX ADMIN — LISINOPRIL SCH MG: 20 TABLET ORAL at 13:16

## 2018-06-23 RX ADMIN — ASPIRIN 81 MG CHEWABLE TABLET SCH MG: 81 TABLET CHEWABLE at 13:30

## 2018-06-23 RX ADMIN — NITROFURANTOIN (MONOHYDRATE/MACROCRYSTALS) SCH MG: 75; 25 CAPSULE ORAL at 08:46

## 2018-06-23 RX ADMIN — ISOSORBIDE MONONITRATE SCH MG: 30 TABLET, EXTENDED RELEASE ORAL at 09:39

## 2018-06-23 RX ADMIN — ASPIRIN 81 MG CHEWABLE TABLET SCH MG: 81 TABLET CHEWABLE at 08:46

## 2018-06-23 RX ADMIN — NITROFURANTOIN (MONOHYDRATE/MACROCRYSTALS) SCH MG: 75; 25 CAPSULE ORAL at 20:10

## 2018-06-23 RX ADMIN — LINAGLIPTIN SCH MG: 5 TABLET, FILM COATED ORAL at 08:46

## 2018-06-23 RX ADMIN — ACETAMINOPHEN SCH ML: 160 SOLUTION ORAL at 16:56

## 2018-06-23 RX ADMIN — ONDANSETRON PRN MG: 2 INJECTION INTRAMUSCULAR; INTRAVENOUS at 13:15

## 2018-06-23 RX ADMIN — PRAVASTATIN SODIUM SCH MG: 40 TABLET ORAL at 20:10

## 2018-06-23 RX ADMIN — ACETAMINOPHEN SCH ML: 160 SOLUTION ORAL at 08:45

## 2018-06-23 NOTE — CONS
CONSULTATION



This is a 78-year-old female she has been admitted with history of dehydration and

diarrhea.  The patient has history of rectal cancer.  She has been on radiation under

care of Oncology.  The patient had a carotid ultrasound which showed right side 80% and

left side is 50% stenosis.  No history of CAD.  No history of TIA or amaurosis fugax.



MEDICAL HISTORY:

Includes:

1. History of rectal cancer.

2. Diabetes mellitus.

3. Hypertension.

4. Hyperlipidemia.



SURGICAL HISTORY:

Patient had a CT of the rectum for that patient had surgery and chemotherapy.



EXAMINATION:

Patient was seen in her room patient lying comfortably.  NEURO: Motor function normal

upper and lower extremity.



The patient had a carotid ultrasound which showed her right side 80% and left side 50%.

At this point, the patient is stable from Vascular point of view.  When patient needs

_____to we can follow in the office.  The patient does not need any _____ at this

point.





MMODL / IJN: [id number] / Job#: [id number]

## 2018-06-23 NOTE — CONS
CONSULTATION



Mrs. Everett is a 78-year-old female who has rectal cancer, who came in with

complaining of nausea and vomiting.  Later, she complained of some discomfort in the

chest and some shortness of breath and then cardiology was consulted on account of

mildly abnormal troponins.  At this time, she is pain-free.  She does not have any

chest discomfort.  She does not appear to be short of breath.



PAST HISTORY:

Of rectal cancer on radiation therapy at this time, diabetes type 2, hypertension,

dyslipidemia, osteoporosis.



PAST SURGICAL HISTORY:

Cholecystectomy.



MEDICATIONS:

Home medications include: Compazine, Zofran, Januvia, Pravachol, metoprolol, enalapril,

Macrobid, and aspirin.



ALLERGIES:

TO CIPROFLOXACIN.



FAMILY HISTORY:

Noncontributory.



SOCIAL HISTORY:

No history of smoking or alcohol use.



REVIEW OF SYSTEMS:

No fever, chills, or rigors.  No cough or expectation.  She complains of shortness of

breath, mild atypical left-sided chest discomfort.  She complained of nausea, vomiting.

No diarrhea.  No hematuria or dysuria.  No strokes or seizures.



PHYSICAL EXAMINATION:

On examination, her blood pressure this morning is 124/61 mmHg, pulse rate in the 80s

to 90s.  Afebrile, 97.2 degrees Fahrenheit.  Breath sounds are reduced bilaterally.

Heart sounds are normal and regular.

Head and neck examination is normal.

ABDOMEN:  Soft.

Heart sounds are normal.  No murmurs or gallops.



LABS:

Reviewed.  White count 3.1, hemoglobin is 10.8.  Electrolytes normal.  Troponin 0.05

and 0.05.  A 12-lead ECG shows sinus rhythm with right bundle branch block.  Left

anterior fascicular block.  Normal OH intervals.  No definite ST-segment abnormalities.



IMPRESSION:

1. Chest discomfort with very borderline troponins.

2. Hypertension.

3. Diabetes type 2.

4. Being treated for rectal cancer at this time, admitted with nausea and vomiting.



SUGGEST:

Medical treatment for likely underlying coronary artery disease.  She is on statins.

Continue aspirin and add Imdur 30 mg p.o. daily.  She will follow up with primary

cardiologist upon discharge.





MMODL / IJN: [id number] / Job#: [id number]

## 2018-06-23 NOTE — CONS
CONSULTATION



DATE OF SERVICE:

2018



REASON FOR CONSULTATION:

Rectal cancer.



REASON FOR ADMISSION:

Progressive weakness.



Edna is a very pleasant 78-year-old  lady, very well known to me, who was

diagnosed with rectal carcinoma on 2018 when she presented with intermittent

diarrhea and hematochezia of 3-6 months' duration.  She underwent a colonoscopy on

2018 by Dr. Brittany Horta; in fact, it was an attempted colonoscopy. It was not

completed due to poor prep; however, she was found to have an ulcerated semi-

circumferential mass at 17 cm from the anal verge and scope could not be passed beyond

that mass.  Biopsy of that mass was positive for invasive adenocarcinoma. She had a CT

scan of the chest, abdomen and pelvis which revealed thickening at the distal sigmoid

but otherwise was negative for metastatic disease.  Subsequently she had an MRI of the

pelvis which revealed thickening of the rectal wall and large regional nodes

bilaterally. The perirectal fat tissue appeared uninvolved.  Subsequently she was

started on neoadjuvant chemoradiation therapy with oral Xeloda concurrently with

radiation therapy. She has completed about 3 weeks of treatment so far.  However, she

presented to the emergency department yesterday with progressive weakness, diarrhea,

mostly watery, and also nausea and vomiting.  She was found to be dehydrated and she

ended up being admitted to the hospital.  She was started on IV fluid. Her troponin was

slightly elevated and she was seen by a cardiologist and it was felt to be nonspecific.

Today she feels a little better.  She is still having issues with diarrhea, mostly

watery, with multiple bowel movements, and she is somewhat nauseated.  She complains of

dryness in her mouth.  She has lost a few pounds over the last week.  Her blood sugar

has been running between 120 and 180 at home. She denies any fever or dysphagia.  No

melena, hematochezia, hematuria, hemoptysis, hematemesis or epistaxis, but overall she

is feeling weak.



PAST MEDICAL HISTORY:

In addition to what is stated above:

1. 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

2. Diabetes.

3. Hyperlipidemia.

4. Coronary artery disease.

5. Overweight.

6. History of osteoporosis.



PAST SURGICAL HISTORY:

1. .

2. Tonsillectomy.

3. Bladder suspension.

4. Colonoscopy.

5. Right knee replacement.

6. Hysterectomy.

7. Cholecystectomy.



FAMILY HISTORY:

Family history for hematologic and oncologic disorder is negative.



SOCIAL HISTORY:

She is . No history of smoking, alcohol abuse or substance abuse.



ALLERGIES:

POLLEN and DUST.



HOME MEDICATIONS:

1. Compazine 10 mg p.o. daily as needed.

2. Zofran 4 mg every 8 hours as needed.

3. Januvia 100 mg p.o. daily.

4. Pravastatin 40 mg daily.

5. Metoprolol 12.5 mg each morning.

6. Enalapril 10 mg b.i.d.

7. Macrobid 100 mg q.12 hours.

8. Xeloda 1500 mg b.i.d. day 1-5 along with radiation therapy.



REVIEW OF SYSTEMS:

As stated above in the history of present illness; otherwise negative.



PHYSICAL EXAMINATION:

She is alert, oriented x3.  She does not appear to be in acute distress.  Well

developed, well nourished.

Her vital signs are temperature 97.2, afebrile, pulse 69 and regular, respirations 16,

blood pressure 164/72.

HEENT:  Normocephalic, atraumatic.  Oral mucosa appeared to be dry.  No thrush.

NECK:  Supple.  No jugular venous distention.

CHEST: Equal expansion bilaterally.

LUNGS:  Clear to auscultation and percussion.

HEART: Regular rate and rhythm.

ABDOMEN:  Soft.  No tenderness.  No obvious organomegaly, masses. No ascites.  Bowel

sounds present.

Extremities reveal trace edema bilaterally.

SKIN: No bruises, ecchymosis, petechiae.

LYMPHATICS: No peripherally enlarged cervical or supraclavicular nodes.

MUSCULOSKELETAL: Moving all extremities appropriately.  No percussion tenderness

detected over spine or sternum.



LABORATORY DATA:

WBC of 3.1, hemoglobin 10.8, hematocrit 34.2, platelets 166.  Her differential is

unremarkable.  Sodium 137, potassium 3.9, chloride 106. BUN is 30, creatinine 0.90.



IMPRESSION:

1. Rectal carcinoma with diagnostic and therapeutic circumstances stated above.

2. Diarrhea along with nausea and vomiting.  This is related to chemoradiation

    therapy.

3. Dehydration related to above.

4. Mild anemia related to concurrent chemoradiation therapy with oral Xeloda.



RECOMMENDATIONS:

1. Continue IV hydration.

2. Check stool for occult blood.

3. Hold oral Xeloda and radiation therapy for now.

4. May consider dose reduction in her Xeloda once her symptoms improve.

5. She may resume her daily aspirin.

The above was discussed in detail with the patient and her family at bedside and I have

answered all their questions to their satisfaction.



Thank you very much for asking me to participate in this nice lady's care.





ZAHIRA / NISHI: [id number] / Job#: [id number]

## 2018-06-23 NOTE — PN
PROGRESS NOTE



DATE OF SERVICE:

06/23/2018



This 78-year-old woman was admitted with diarrhea, weakness, still having 
diarrhea.

Patient  aortic stenosis,  the patient has history of rectal cancer as well.  
The

patient also had some GI bleed also.  The patient being closely monitored at 
this time.



PAST MEDICAL HISTORY:

Reviewed.



REVIEW OF SYSTEMS:

Cardiovascular: No angina or palpitations. Respiration  as mentioned earlier.

GI:  As mentioned earlier. : No dysuria. Central nervous system: No focal 
deficits.



CURRENT MEDICATIONS:

1. Tylenol p.r.n.

2. Xanax 0.25 t.i.d.

3. Aspirin 81 mg.

4. Imdur.

6. Narcan.

7. Macrobid.

8. Zofran.

9. Percocet.

10.Protonix.

11.Prevacid.

12.Compazine, doses reviewed.



PHYSICAL EXAM:

Patient is alert, oriented times three.  Pulse is 79.  Blood pressure 130/56,

respirations 16, temp 97.5, pulse ox 98% on room air.

HEENT: Conjunctivae normal.  Oral mucosa moist.

NECK: No jugular venous distention.  No carotid bruit. No lymph node 
enlargement.

CARDIOVASCULAR: S1, S2 muffled.

RESPIRATORY: Breath sounds diminished in the bases.  A few scattered rhonchi.  
No

crackles.

ABDOMEN:  Soft, nontender.  Legs:  No edema. No swelling.

CENTRAL NERVOUS SYSTEM: No focal deficits.



LABS:

WBC 3.2, hemoglobin 10.8, glucose 158.  Troponin 0.058.



ASSESSMENT:

1. Diarrhea, weakness, dehydration.

2. Possible gastrointestinal bleed.

3. Carotid stenosis, right 80%, 50% stenosis.

4. Troponin 0.039, possible acute non ST-segment elevation myocardial 
infarction.

5. Rectal cancer on treatment.

6. Diabetes type 2.

7. Hypertension.

8. Hyperlipidemia.

10.History of cholecystectomy.

11.Carotid stenosis.

12.FULL CODE.



RECOMMENDATION AND DISCUSSION:

Recommend to continue current medication, continue to monitor.  Symptomatic 
treatment.

Otherwise at this time we will recommend the patient to closely follow with

Gastroenterology evaluation.  The symptomatology could be due to secondary to

chemoradiation.  Aspirin will be continued.  The hemoglobin will be closely 
monitored.

Once again, overall prognosis extremely guarded and further recommendations to 
follow.

See orders for details.





MMODL / IJN: [id number] / Job#: [id number]

[facility]

## 2018-06-24 LAB
ANION GAP SERPL CALC-SCNC: 10 MMOL/L
BASOPHILS # BLD AUTO: 0 K/UL (ref 0–0.2)
BASOPHILS NFR BLD AUTO: 0 %
BUN SERPL-SCNC: 31 MG/DL (ref 7–17)
CALCIUM SPEC-MCNC: 7.5 MG/DL (ref 8.4–10.2)
CHLORIDE SERPL-SCNC: 110 MMOL/L (ref 98–107)
CO2 SERPL-SCNC: 17 MMOL/L (ref 22–30)
EOSINOPHIL # BLD AUTO: 0.1 K/UL (ref 0–0.7)
EOSINOPHIL NFR BLD AUTO: 5 %
ERYTHROCYTE [DISTWIDTH] IN BLOOD BY AUTOMATED COUNT: 3.2 M/UL (ref 3.8–5.4)
ERYTHROCYTE [DISTWIDTH] IN BLOOD: 18.5 % (ref 11.5–15.5)
GLUCOSE BLD-MCNC: 132 MG/DL (ref 75–99)
GLUCOSE SERPL-MCNC: 125 MG/DL (ref 74–99)
HCT VFR BLD AUTO: 30.9 % (ref 34–46)
HGB BLD-MCNC: 9.6 GM/DL (ref 11.4–16)
LYMPHOCYTES # SPEC AUTO: 0.2 K/UL (ref 1–4.8)
LYMPHOCYTES NFR SPEC AUTO: 10 %
MCH RBC QN AUTO: 29.9 PG (ref 25–35)
MCHC RBC AUTO-ENTMCNC: 31 G/DL (ref 31–37)
MCV RBC AUTO: 96.6 FL (ref 80–100)
MONOCYTES # BLD AUTO: 0.2 K/UL (ref 0–1)
MONOCYTES NFR BLD AUTO: 9 %
NEUTROPHILS # BLD AUTO: 1.5 K/UL (ref 1.3–7.7)
NEUTROPHILS NFR BLD AUTO: 74 %
PLATELET # BLD AUTO: 154 K/UL (ref 150–450)
POTASSIUM SERPL-SCNC: 3.8 MMOL/L (ref 3.5–5.1)
SODIUM SERPL-SCNC: 137 MMOL/L (ref 137–145)
WBC # BLD AUTO: 2 K/UL (ref 3.8–10.6)

## 2018-06-24 RX ADMIN — CEFAZOLIN SCH MLS/HR: 330 INJECTION, POWDER, FOR SOLUTION INTRAMUSCULAR; INTRAVENOUS at 11:32

## 2018-06-24 RX ADMIN — ISOSORBIDE MONONITRATE SCH MG: 30 TABLET, EXTENDED RELEASE ORAL at 11:09

## 2018-06-24 RX ADMIN — CHOLESTYRAMINE SCH GM: 4 POWDER, FOR SUSPENSION ORAL at 17:34

## 2018-06-24 RX ADMIN — LINAGLIPTIN SCH MG: 5 TABLET, FILM COATED ORAL at 08:29

## 2018-06-24 RX ADMIN — NITROFURANTOIN (MONOHYDRATE/MACROCRYSTALS) SCH MG: 75; 25 CAPSULE ORAL at 20:09

## 2018-06-24 RX ADMIN — CEFAZOLIN SCH MLS/HR: 330 INJECTION, POWDER, FOR SOLUTION INTRAMUSCULAR; INTRAVENOUS at 06:43

## 2018-06-24 RX ADMIN — METOPROLOL SUCCINATE SCH MG: 25 TABLET, EXTENDED RELEASE ORAL at 08:30

## 2018-06-24 RX ADMIN — PRAVASTATIN SODIUM SCH MG: 40 TABLET ORAL at 20:09

## 2018-06-24 RX ADMIN — ACETAMINOPHEN SCH ML: 160 SOLUTION ORAL at 17:34

## 2018-06-24 RX ADMIN — ACETAMINOPHEN SCH ML: 160 SOLUTION ORAL at 22:46

## 2018-06-24 RX ADMIN — ACETAMINOPHEN SCH ML: 160 SOLUTION ORAL at 08:33

## 2018-06-24 RX ADMIN — CEFAZOLIN SCH: 330 INJECTION, POWDER, FOR SOLUTION INTRAMUSCULAR; INTRAVENOUS at 17:28

## 2018-06-24 RX ADMIN — ASPIRIN 81 MG CHEWABLE TABLET SCH MG: 81 TABLET CHEWABLE at 08:29

## 2018-06-24 RX ADMIN — LISINOPRIL SCH MG: 20 TABLET ORAL at 11:09

## 2018-06-24 RX ADMIN — NITROFURANTOIN (MONOHYDRATE/MACROCRYSTALS) SCH MG: 75; 25 CAPSULE ORAL at 08:29

## 2018-06-24 RX ADMIN — CHOLESTYRAMINE SCH: 4 POWDER, FOR SUSPENSION ORAL at 17:35

## 2018-06-24 RX ADMIN — PANTOPRAZOLE SODIUM SCH MG: 40 TABLET, DELAYED RELEASE ORAL at 06:43

## 2018-06-24 NOTE — PN
PROGRESS NOTE



DATE OF SERVICE:

June 24, 2018.



CHIEF COMPLAINT:

Tired.



Edna seen today as a followup.  She feels tired but overall better than yesterday.

She is eating better.  Her nausea is much better.  She continued to have some diarrhea.

No fever or chills.  No melena, hematochezia or hematuria.



Her current medication includes Tylenol as needed, Xanax as needed, aspirin 81 mg

daily, Imdur 15 mg daily, Tradjenta 5 mg daily, Zestril 40 mg daily, metoprolol 12.5 mg

daily, morphine sulfate 4 mg IV as needed, Macrobid 100 mg q.12 hours, Zofran as

needed, Percocet 5/325 mg as needed, Protonix 40 mg p.o. daily, Pravachol 40 mg daily,

Compazine as needed, Restoril as needed, Ultram as needed.



On physical exam she is alert, oriented x3.  No acute distress.  Well developed, well

nourished.  Vital signs temperature 97.7 afebrile, pulse 72 regular, respirations 16,

blood pressure 156/68.

HEENT:  Normocephalic, atraumatic.  No icterus.

NECK:  Supple.

CHEST: Equal expansion bilaterally.

LUNGS:  Clear to auscultation.

HEART: Regular rhythm.

ABDOMEN:  Soft.  No obvious organomegaly or masses.  No tenderness.  Bowel sounds

present.

EXTREMITIES: Reveal trace edema.



Laboratory data from today, WBC of 2.0, hemoglobin 9.6, hematocrit 30.9, platelet 154.

Sodium 137, potassium 3.8, chloride is 110, CO2 is 17.  The BUN is 31, creatinine 1.0.



IMPRESSION:

1. Rectal carcinoma, currently on neoadjuvant chemoradiation therapy with _____ oral

    Xeloda.

2. Diarrhea, nausea, vomiting.  This is related to chemoradiation therapy, appears to

    be slowly improving.

3. Dehydration appears to be improving as well.



RECOMMENDATION:

1. Continue IV hydration.

2. Continue current supportive care.

3. To hold the Xeloda and radiation therapy for now until her symptoms significantly

    improve.

Thank you very much.





MMODL / IJN: [id number] / Job#: [id number]

## 2018-06-24 NOTE — PN
PROGRESS NOTE



DATE OF SERVICE:

06/24/2018



This 78-year-old woman with a past medical history of rectal cancer on 
chemoradiation

complaining of diarrhea, weakness and tiredness.  The patient has significant 
diarrhea

at this time. C. difficile has been negative. Plain x-ray abdomen KUB showed 
normal

abdomen.  Patient has been closely monitor.  Dr. Balderrama is following the patient

closely. The patient also has leukopenia and anemia also possibly secondary to

chemotherapy at this time.  BUN, creatinine is improving.



PAST MEDICAL HISTORY:

Reviewed.



REVIEW OF SYSTEMS:

CARDIOVASCULAR:  No angina,.

RESPIRATORY: As mentioned earlier.

GI: As mentioned.

: No dysuria.

NERVOUS SYSTEM: No numbness or weakness.

ALLERGY/IMMUNOLOGY: NO asthma.

MUSCULOSKELETAL: As mentioned earlier.

HEMATOLOGY/ONCOLOGY:  No history of anemia.



MEDICATIONS:

1. Tylenol p.r.n.

2. Xanax 0.5 t.i.d.

3. Aspirin 81 mg.

4. Questran 4 g p.o. t.i.d.

5. Imdur.

6. Tradjenta.

7. Zestril.

8. Imodium.

9. Toprol-XL.

10.Macrobid.

11.Zofran.

12.Percocet.

13.Protonix.

15.Compazine.

16.Restoril.

17.Ultram.



PHYSICAL EXAM:

Patient is alert, oriented x3.  Pulse is 67, blood pressure 103/52, respiration 
16,

temperature 97.2, pulse ox 98% room air.

Neck is no jugular venous distention.  No carotid bruit. No lymph node 
enlargement.

Oral mucosa dry.

CARDIOVASCULAR: S1, S2. No S3, no S4.

RESPIRATORY: Breath sounds diminished in the bases. No rhonchi, no crackles.

ABDOMEN: Soft, mild diffuse tenderness.  Otherwise no guarding, no rigidity.  
No mass

palpable.

LEGS: No edema, no swelling.

NERVOUS SYSTEM: Higher functions as mentioned earlier. Moves all four limbs. No 
focal

motor deficits.

LYMPHATICS: No lymphadenopathy in the neck, axillae, groin.

SKIN:  No ulcer, rash or bleeding.



LAB STUDIES:

WBC 2, hemoglobin 9.6, sodium 137, potassium 3.8, CO2 17.



ASSESSMENT:

1. Continued diarrhea, weakness, dehydration, possibly secondary from 
chemotherapy

    effect.

2. Possible lower gastrointestinal bleed.

3. Dehydration, present on admission.

4. Carotid stenosis, right 80% and left 50% stenosis.

5. Troponin 0.039, possible acute non ST-segment elevation myocardial infarction
, on

    admission.

6. Rectal cancer on treatment.

7. Diabetes mellitus type 2.

8. Hypertension.

9. Hyperlipidemia.

10.History of cholecystectomy.

11.FULL CODE.



RECOMMENDATIONS AND DISCUSSION:

This 78-year-old woman who presented with multiple complex medical issues, will 
monitor

the patient closely. Continue the current management and symptomatic treatment. 
I

recommend a lactose-free diet, low residue. Otherwise, I would also recommend 
Imodium

p.r.n., Questran, yogurt supplementation.  Otherwise, continue with the rest of 
the

medications. Cut down the IV fluids to 50 mL/hour.  The prognosis is guarded 
because of

multiple complex medical issues. Further recommendations to follow.





MMODL / IJN: [id number] / Job#: [id number]

MTDD

## 2018-06-24 NOTE — XR
EXAMINATION TYPE: XR abdomen acute w cxr

 

DATE OF EXAM: 6/24/2018

 

COMPARISON: NONE

 

HISTORY: Acute diarrhea

 

TECHNIQUE: Chest is examined in the frontal projection. Abdomen is examined in the upright and supine
 views.

 

FINDINGS: 

Nonspecific bowel gas is present within small bowel loops as well as the colon. Psoas margins are nor
mal. Cholecystectomy clips are in the right upper quadrant. Organomegaly is not evident. Vascular alo
cification is noted.

 

No suspicious air-fluid levels or differential air-fluid levels are present. No free air is present. 
Lung fields are clear.

 

IMPRESSION:

1.  Normal acute abdominal series.

## 2018-06-25 LAB
ANION GAP SERPL CALC-SCNC: 11 MMOL/L
BASOPHILS # BLD AUTO: 0 K/UL (ref 0–0.2)
BASOPHILS NFR BLD AUTO: 0 %
BUN SERPL-SCNC: 22 MG/DL (ref 7–17)
CALCIUM SPEC-MCNC: 8.2 MG/DL (ref 8.4–10.2)
CHLORIDE SERPL-SCNC: 112 MMOL/L (ref 98–107)
CO2 SERPL-SCNC: 17 MMOL/L (ref 22–30)
EOSINOPHIL # BLD AUTO: 0.2 K/UL (ref 0–0.7)
EOSINOPHIL NFR BLD AUTO: 7 %
ERYTHROCYTE [DISTWIDTH] IN BLOOD BY AUTOMATED COUNT: 3.56 M/UL (ref 3.8–5.4)
ERYTHROCYTE [DISTWIDTH] IN BLOOD: 18.8 % (ref 11.5–15.5)
GLUCOSE BLD-MCNC: 135 MG/DL (ref 75–99)
GLUCOSE BLD-MCNC: 141 MG/DL (ref 75–99)
GLUCOSE SERPL-MCNC: 117 MG/DL (ref 74–99)
HCT VFR BLD AUTO: 34.5 % (ref 34–46)
HGB BLD-MCNC: 11 GM/DL (ref 11.4–16)
LYMPHOCYTES # SPEC AUTO: 0.5 K/UL (ref 1–4.8)
LYMPHOCYTES NFR SPEC AUTO: 18 %
MCH RBC QN AUTO: 30.8 PG (ref 25–35)
MCHC RBC AUTO-ENTMCNC: 31.8 G/DL (ref 31–37)
MCV RBC AUTO: 96.8 FL (ref 80–100)
MONOCYTES # BLD AUTO: 0.2 K/UL (ref 0–1)
MONOCYTES NFR BLD AUTO: 6 %
NEUTROPHILS # BLD AUTO: 1.8 K/UL (ref 1.3–7.7)
NEUTROPHILS NFR BLD AUTO: 66 %
PLATELET # BLD AUTO: 208 K/UL (ref 150–450)
POTASSIUM SERPL-SCNC: 4 MMOL/L (ref 3.5–5.1)
SODIUM SERPL-SCNC: 140 MMOL/L (ref 137–145)
WBC # BLD AUTO: 2.7 K/UL (ref 3.8–10.6)

## 2018-06-25 RX ADMIN — LOPERAMIDE HYDROCHLORIDE PRN MG: 2 CAPSULE ORAL at 04:23

## 2018-06-25 RX ADMIN — CHOLESTYRAMINE SCH GM: 4 POWDER, FOR SUSPENSION ORAL at 09:34

## 2018-06-25 RX ADMIN — PANTOPRAZOLE SODIUM SCH MG: 40 TABLET, DELAYED RELEASE ORAL at 09:31

## 2018-06-25 RX ADMIN — Medication SCH: at 23:34

## 2018-06-25 RX ADMIN — ACETAMINOPHEN SCH: 160 SOLUTION ORAL at 22:26

## 2018-06-25 RX ADMIN — LOPERAMIDE HYDROCHLORIDE PRN MG: 2 CAPSULE ORAL at 14:54

## 2018-06-25 RX ADMIN — Medication SCH ML: at 20:22

## 2018-06-25 RX ADMIN — METHYLPREDNISOLONE SODIUM SUCCINATE SCH MG: 40 INJECTION, POWDER, FOR SOLUTION INTRAMUSCULAR; INTRAVENOUS at 23:34

## 2018-06-25 RX ADMIN — CEFAZOLIN SCH: 330 INJECTION, POWDER, FOR SOLUTION INTRAMUSCULAR; INTRAVENOUS at 11:35

## 2018-06-25 RX ADMIN — INSULIN ASPART SCH UNIT: 100 INJECTION, SOLUTION INTRAVENOUS; SUBCUTANEOUS at 20:21

## 2018-06-25 RX ADMIN — ACETAMINOPHEN SCH ML: 160 SOLUTION ORAL at 09:34

## 2018-06-25 RX ADMIN — ASPIRIN 81 MG CHEWABLE TABLET SCH MG: 81 TABLET CHEWABLE at 09:32

## 2018-06-25 RX ADMIN — LISINOPRIL SCH MG: 20 TABLET ORAL at 09:34

## 2018-06-25 RX ADMIN — LINAGLIPTIN SCH MG: 5 TABLET, FILM COATED ORAL at 09:33

## 2018-06-25 RX ADMIN — CEFAZOLIN SCH MLS/HR: 330 INJECTION, POWDER, FOR SOLUTION INTRAMUSCULAR; INTRAVENOUS at 23:34

## 2018-06-25 RX ADMIN — METHYLPREDNISOLONE SODIUM SUCCINATE SCH MG: 40 INJECTION, POWDER, FOR SOLUTION INTRAMUSCULAR; INTRAVENOUS at 16:40

## 2018-06-25 RX ADMIN — METOPROLOL SUCCINATE SCH MG: 25 TABLET, EXTENDED RELEASE ORAL at 09:33

## 2018-06-25 RX ADMIN — CHOLESTYRAMINE SCH: 4 POWDER, FOR SUSPENSION ORAL at 15:25

## 2018-06-25 RX ADMIN — PRAVASTATIN SODIUM SCH MG: 40 TABLET ORAL at 20:21

## 2018-06-25 RX ADMIN — INSULIN ASPART SCH UNIT: 100 INJECTION, SOLUTION INTRAVENOUS; SUBCUTANEOUS at 17:55

## 2018-06-25 RX ADMIN — NITROFURANTOIN (MONOHYDRATE/MACROCRYSTALS) SCH MG: 75; 25 CAPSULE ORAL at 20:21

## 2018-06-25 RX ADMIN — NITROFURANTOIN (MONOHYDRATE/MACROCRYSTALS) SCH MG: 75; 25 CAPSULE ORAL at 09:34

## 2018-06-25 RX ADMIN — ISOSORBIDE MONONITRATE SCH MG: 30 TABLET, EXTENDED RELEASE ORAL at 09:33

## 2018-06-25 RX ADMIN — CHOLESTYRAMINE SCH GM: 4 POWDER, FOR SUSPENSION ORAL at 17:56

## 2018-06-25 RX ADMIN — ACETAMINOPHEN SCH ML: 160 SOLUTION ORAL at 16:40

## 2018-06-25 NOTE — P.PN
Subjective


Progress Note Date: 06/25/18


Principal diagnosis: 





NSTEMI





Pt seen in f/u, mucositis is a little better, she continues to have diarrhea, 

the stool "runs like urine", she has lower abd pain, currently on clear liquids

, the skin on her hands is starting to heal.  No other c/o today.  





Objective





- Vital Signs


Vital signs: 


 Vital Signs











Temp  97.6 F   06/25/18 14:30


 


Pulse  67   06/25/18 14:30


 


Resp  16   06/25/18 14:30


 


BP  134/76   06/25/18 14:30


 


Pulse Ox  99   06/25/18 14:30








 Intake & Output











 06/24/18 06/25/18 06/25/18





 18:59 06:59 18:59


 


Intake Total 390 1230 400


 


Output Total 3  2


 


Balance 387 1230 398


 


Weight  86.3 kg 


 


Intake:   


 


  IV  400 400


 


    Sodium Chloride 0.9% 1,  400 400





    000 ml @ 50 mls/hr IV .   





    Q20H [facility] Rx#:[id number]   


 


  Intake, IV Titration 150  





  Amount   


 


    Sodium Chloride 0.9% 1, 150  





    000 ml @ 50 mls/hr IV .   





    Q20H [facility] Rx#:[id number]   


 


  Oral 240 830 


 


Output:   


 


  Stool 3  2


 


Other:   


 


  Voiding Method Toilet Toilet Toilet


 


  # Voids 1 1 1


 


  # Bowel Movements 1 4 1














- Constitutional


General appearance: Present: cooperative, no acute distress, obese





- EENT


EENT Comment(s): 





oral redness, no thrush


Eyes: Present: anicteric sclerae





- Respiratory


Respiratory: bilateral: CTA





- Cardiovascular


Heart sounds: normal: S1, S2





- Peripheral edema


  ** leg


Peripheral Edema: bilateral: 1+





- Gastrointestinal


General gastrointestinal: Present: soft, tenderness


Localized gastrointestinal: tender: epigastric periumbilical, suprabubic





- Integumentary


Integumentary Comment(s): 





skin on feet is dry, peeling, redness noted, mild/moderate PPE from chemo, no 

open blisters


Integumentary: Present: pale





- Neurologic


Neurologic: Present: CNII-XII intact





- Musculoskeletal


Musculoskeletal: Present: generalized weakness





- Psychiatric


Psychiatric: Present: A&O x's 3, appropriate affect, intact judgment & insight





- Labs


CBC & Chem 7: 


 06/25/18 07:36





 06/25/18 07:36


Labs: 


 Abnormal Lab Results - Last 24 Hours (Table)











  06/25/18 06/25/18 06/25/18 Range/Units





  07:36 07:36 17:20 


 


WBC  2.7 L    (3.8-10.6)  k/uL


 


RBC  3.56 L    (3.80-5.40)  m/uL


 


Hgb  11.0 L    (11.4-16.0)  gm/dL


 


RDW  18.8 H    (11.5-15.5)  %


 


Lymphocytes #  0.5 L    (1.0-4.8)  k/uL


 


Chloride   112 H   ()  mmol/L


 


Carbon Dioxide   17 L   (22-30)  mmol/L


 


BUN   22 H   (7-17)  mg/dL


 


Glucose   117 H   (74-99)  mg/dL


 


POC Glucose (mg/dL)    141 H  (75-99)  mg/dL


 


Calcium   8.2 L   (8.4-10.2)  mg/dL














Assessment and Plan


(1) Palmar plantar erythrodysaesthesia due to cytotoxic therapy


Current Visit: Yes   Status: Acute   Priority: High   Code(s): L27.1 - LOC SKIN 

ERUPTION DUE TO DRUGS AND MEDS TAKEN INTERNALLY; T45.1X5A - ADVERSE EFFECT OF 

ANTINEOPLASTIC AND IMMUNOSUP DRUGS, INIT   SNOMED Code(s): 723152203


   





(2) Diarrhea


Current Visit: Yes   Status: Acute   Priority: High   Code(s): R19.7 - DIARRHEA

, UNSPECIFIED   SNOMED Code(s): 01583723


   





(3) Mucositis due to radiation therapy


Current Visit: Yes   Status: Acute   Priority: High   Code(s): K12.33 - ORAL 

MUCOSITIS (ULCERATIVE) DUE TO RADIATION   SNOMED Code(s): 413297369


   





(4) Pancytopenia due to antineoplastic chemotherapy


Current Visit: Yes   Status: Acute   Priority: High   Code(s): D61.810 - 

ANTINEOPLASTIC CHEMOTHERAPY INDUCED PANCYTOPENIA; T45.1X5A - ADVERSE EFFECT OF 

ANTINEOPLASTIC AND IMMUNOSUP DRUGS, INIT   SNOMED Code(s): 093555165608897


   


Plan: 





All above are noted SE of treatment with Xeloda and radiation.  Pt is requiring 

a prolonged recovery period from treatment.  Plan is to hold treatment for now, 

pt will meet with Med Onc and Rad Onc after recovery for plan.  Pt may require 

DPD deficiency testing, may need reduced dose of oral 5FU to tolerate 

treatment.  Decisions to be made based on clinical recovery.  Pt understands 

plan.  





Pt can do rehab if that will aid in her recovery, treatment can be considered 

post rehab.

## 2018-06-25 NOTE — P.CONS
History of Present Illness





- Reason for Consult


Consult date: 06/24/18


GI bleeding





- History of Present Illness





The patient is a 78-year-old female who presented to the hospital with the 

complaints of weakness as well as nausea, vomiting and diarrhea and a rash on 

her extremities.  We are asked to see her for possible GI bleeding.





The patient has history of rectal cancer diagnosed in April of this year and 

she is on adjuvant radiation/chemotherapy.  Her radiation was started around 1 

week ago.  The patient is complaining of sores in her mouth.  She was evaluated 

by oncology: Recurring with her car and supportive care measures.





She denies hematemesis or hematochezia.  Denied any new neurologic complaints.  

The some chest discomfort and mild elevation in her troponins.  Cardiology 

recommending medical measures.





Review of Systems





Constitutional: Denies fever, chills, sweats, weight gain, or loss.


HEENT: Negative for migraines, blurred vision or loss, earaches, drainage, 

tinnitus, oral mucosal lesions, dysphagia, or odynophagia.


CARDIAC: Mild chest pain, no arrhythmias, or palpitation.


RESPIRATORY: Negative for shortness of breath, hemoptysis, cough, or sputum 

production.


GI: See HPI for pertinent findings.


: Negative for hematuria, urgency, frequency, polyuria, or dysuria.


MUSCULOSKELETAL: Negative for muscle aches, swelling, arthritis, and 

arthralgias.  


NEUROLOGIC: Negative for stroke or TIA.


ENDOCRINE: Negative for thyroid problems.


SKIN: Negative for rash or itching.


PSYCHIATRIC: Negative history for depression and anxiety





Past Medical History


Past Medical History: Cancer, Diabetes Mellitus, Hyperlipidemia, Hypertension


Additional Past Medical History / Comment(s): osteoporosis, rectal cancer


History of Any Multi-Drug Resistant Organisms: None Reported


Past Surgical History: Cholecystectomy, Hysterectomy, Orthopedic Surgery, 

Tonsillectomy


Past Anesthesia/Blood Transfusion Reactions: No Reported Reaction


Past Psychological History: No Psychological Hx Reported


Smoking Status: Never smoker


Past Alcohol Use History: None Reported


Past Drug Use History: None Reported





- Past Family History


  ** Mother


Family Medical History: No Reported History





  ** Father


Family Medical History: Myocardial Infarction (MI)





Medications and Allergies


 Home Medications











 Medication  Instructions  Recorded  Confirmed  Type


 


Enalapril Maleate [Enalapril 10 mg PO BID 03/21/16 06/22/18 History





Maleate]    


 


Metoprolol Succinate [Metoprolol 12.5 mg PO QAM 03/21/16 06/22/18 History





Succinate]    


 


sitaGLIPtin [Januvia] 100 mg PO DAILY 03/20/18 06/22/18 History


 


Pravastatin Sodium [Pravachol] 40 mg PO HS 04/13/18 06/22/18 History


 


Nitrofurantoin Monohyd/M-Cryst 100 mg PO Q12HR 06/22/18 06/22/18 History





[Macrobid]    


 


Ondansetron [Zofran ODT] 4 mg PO Q8HR PRN 06/22/18 06/22/18 History


 


Prochlorperazine [Compazine] 10 mg PO DAILY PRN 06/22/18 06/22/18 History











 Allergies











Allergy/AdvReac Type Severity Reaction Status Date / Time


 


ciprofloxacin [From Cipro] AdvReac  Blackout Verified 06/22/18 16:17


 


ciprofloxacin HCl AdvReac  Blackout Verified 06/22/18 16:17





[From Cipro]     














Physical Exam


Vitals: 


 Vital Signs











  Temp Pulse Resp BP Pulse Ox


 


 06/24/18 11:10  97.7 F  72  16  156/68  96


 


 06/24/18 08:20  97.3 F L  67  16  103/53  98


 


 06/24/18 04:00  96.8 F L  58 L  18  119/57  96


 


 06/24/18 00:00   68  20  


 


 06/23/18 20:00  97.2 F L  68  20  104/50  99


 


 06/23/18 16:00  96.5 F L  79  16  113/53  98








 Intake and Output











 06/23/18 06/24/18 06/24/18





 22:59 06:59 14:59


 


Intake Total 390  240


 


Balance 390  240


 


Intake:   


 


    


 


    Sodium Chloride 0.9% 1, 150  





    000 ml @ 75 mls/hr IV .   





    [id number] [facility] Rx#:[id number]   


 


  Oral 240  240


 


Other:   


 


  Voiding Method Toilet Toilet Toilet


 


  # Voids 1 1 1


 


  # Bowel Movements 0 1 1


 


  Weight  86.3 kg 














General appearance: The patient is alert, oriented, in no acute distress.


HET: Head is normocephalic and atraumatic.  Pupils are equal and reactive.  

Oropharynx is clear without lesions.


Neck: Supple without lymphadenopathy.  Trachea midline.


Heart: S1 S2.  Regular rate and rhythm.


Lungs: No crackles or wheezes are heard. No dullness to percussion.


Abdomen: Soft, nontender, nondistended with  bowel sounds.  No peritoneal 

signs.  No palpable organomegaly or masses.


Extremities: Normal skin color and turgor.  No cyanosis, rash, ulceration, 

clubbing, or edema.  Radial and pedal pulses are 2/4 bilaterally.


Neurological: No focal deficits.  Strength and sensation are grossly intact.





Results


CBC & Chem 7: 


 06/24/18 06:42





 06/24/18 06:42


Labs: 


 Abnormal Lab Results - Last 24 Hours (Table)











  06/23/18 06/23/18 06/24/18 Range/Units





  16:53 21:04 05:48 


 


WBC     (3.8-10.6)  k/uL


 


RBC     (3.80-5.40)  m/uL


 


Hgb     (11.4-16.0)  gm/dL


 


Hct     (34.0-46.0)  %


 


RDW     (11.5-15.5)  %


 


Lymphocytes #     (1.0-4.8)  k/uL


 


Chloride     ()  mmol/L


 


Carbon Dioxide     (22-30)  mmol/L


 


BUN     (7-17)  mg/dL


 


Glucose     (74-99)  mg/dL


 


POC Glucose (mg/dL)  158 H  138 H  132 H  (75-99)  mg/dL


 


Calcium     (8.4-10.2)  mg/dL














  06/24/18 06/24/18 Range/Units





  06:42 06:42 


 


WBC  2.0 L*   (3.8-10.6)  k/uL


 


RBC  3.20 L   (3.80-5.40)  m/uL


 


Hgb  9.6 L   (11.4-16.0)  gm/dL


 


Hct  30.9 L   (34.0-46.0)  %


 


RDW  18.5 H   (11.5-15.5)  %


 


Lymphocytes #  0.2 L   (1.0-4.8)  k/uL


 


Chloride   110 H  ()  mmol/L


 


Carbon Dioxide   17 L  (22-30)  mmol/L


 


BUN   31 H  (7-17)  mg/dL


 


Glucose   125 H  (74-99)  mg/dL


 


POC Glucose (mg/dL)    (75-99)  mg/dL


 


Calcium   7.5 L  (8.4-10.2)  mg/dL














Assessment and Plan


Assessment: 





78-year-old female with rectal cancer on adjuvant chemoradiation therapy.  Her 

GI complaints are most likely related to her treatment and it appears to be 

improving.


Plan: 





Agree with your current management.  Will continue to minitor her counts 

closely.  I did not schedule any endoscopy at this time.  Will follow with you 

with interest.

## 2018-06-25 NOTE — PN
PROGRESS NOTE



DATE OF SERVICE:

06/25/2018



This 78-year-old woman who was admitted with continued diarrhea and dehydration 
also

has a history of rectal cancer.  The patient is suspected to have GI bleed also.

Patient also had carotid stenosis. No chest pain.  No palpitations.  No fever.



On exam, alert and oriented x3. Pulse 68, blood pressure 172/70, respiration 16,

temperature 97.8, pulse ox 98% on room air.

HEENT: Conjunctivae normal. Oral mucosa moist.

NECK: No jugular venous distention. No carotid bruit. No lymph node enlargement.

CARDIOVASCULAR SYSTEM:  S1, S2 muffled.

RESPIRATORY SYSTEM: Breath sounds diminished at the bases.  A few scattered 
rhonchi. No

crackles.

ABDOMEN: Soft, non-tender. No mass palpable.

LEGS: No edema. No swelling.

NERVOUS SYSTEM: No focal deficit.



LABS: WBC 2.7, hemoglobin 11. Sodium 140, potassium 4.  Hepatitis panel is non-

reactive.



ASSESSMENT:

1. Continued diarrhea, weakness, dehydration, possibly secondary to chemotherapy

    effect.

2. Possible enteritis.

3. Possible lower gastrointestinal bleed.

4. Dehydration, present on admission.

5. Carotid stenosis: right 80%, left 30% stenosis.

6. Troponin 0.039, indeterminate, on admission. Possible  non-ST-segment-
elevation

    myocardial infarction.

7. Rectal cancer, on treatment.

8. Diabetes mellitus, type 2.

9. Hypertension.

10.Hyperlipidemia.

11.History of cholecystectomy.

12.FULL CODE.



RECOMMENDATION AND DISCUSSION:

At this time I recommend to continue current medication, continue with the 
monitoring,

symptomatic treatment. Otherwise at this time I would recommend symptomatic 
treatment.

I would also recommend a short course of IV steroids.  Monitor blood sugars 
closely.

empirically.  Otherwise, further recommendations to follow. See orders for

further details.





MMODL / IJN: [id number] / Job#: [id number]

MTDD

## 2018-06-26 LAB
ANION GAP SERPL CALC-SCNC: 12 MMOL/L
BASOPHILS # BLD AUTO: 0 K/UL (ref 0–0.2)
BASOPHILS NFR BLD AUTO: 0 %
BUN SERPL-SCNC: 18 MG/DL (ref 7–17)
CALCIUM SPEC-MCNC: 8.6 MG/DL (ref 8.4–10.2)
CHLORIDE SERPL-SCNC: 112 MMOL/L (ref 98–107)
CO2 SERPL-SCNC: 16 MMOL/L (ref 22–30)
EOSINOPHIL # BLD AUTO: 0 K/UL (ref 0–0.7)
EOSINOPHIL NFR BLD AUTO: 1 %
ERYTHROCYTE [DISTWIDTH] IN BLOOD BY AUTOMATED COUNT: 3.46 M/UL (ref 3.8–5.4)
ERYTHROCYTE [DISTWIDTH] IN BLOOD: 17.8 % (ref 11.5–15.5)
GLUCOSE BLD-MCNC: 164 MG/DL (ref 75–99)
GLUCOSE BLD-MCNC: 170 MG/DL (ref 75–99)
GLUCOSE BLD-MCNC: 186 MG/DL (ref 75–99)
GLUCOSE SERPL-MCNC: 153 MG/DL (ref 74–99)
HBA1C MFR BLD: 6.6 % (ref 4–6)
HCT VFR BLD AUTO: 33.2 % (ref 34–46)
HGB BLD-MCNC: 10.2 GM/DL (ref 11.4–16)
LYMPHOCYTES # SPEC AUTO: 0.3 K/UL (ref 1–4.8)
LYMPHOCYTES NFR SPEC AUTO: 15 %
MCH RBC QN AUTO: 29.4 PG (ref 25–35)
MCHC RBC AUTO-ENTMCNC: 30.7 G/DL (ref 31–37)
MCV RBC AUTO: 96 FL (ref 80–100)
MONOCYTES # BLD AUTO: 0.2 K/UL (ref 0–1)
MONOCYTES NFR BLD AUTO: 8 %
NEUTROPHILS # BLD AUTO: 1.7 K/UL (ref 1.3–7.7)
NEUTROPHILS NFR BLD AUTO: 75 %
PLATELET # BLD AUTO: 214 K/UL (ref 150–450)
POTASSIUM SERPL-SCNC: 4.7 MMOL/L (ref 3.5–5.1)
SODIUM SERPL-SCNC: 140 MMOL/L (ref 137–145)
WBC # BLD AUTO: 2.3 K/UL (ref 3.8–10.6)

## 2018-06-26 RX ADMIN — PRAVASTATIN SODIUM SCH MG: 40 TABLET ORAL at 21:05

## 2018-06-26 RX ADMIN — PANTOPRAZOLE SODIUM SCH MG: 40 TABLET, DELAYED RELEASE ORAL at 08:45

## 2018-06-26 RX ADMIN — INSULIN ASPART SCH UNIT: 100 INJECTION, SOLUTION INTRAVENOUS; SUBCUTANEOUS at 08:40

## 2018-06-26 RX ADMIN — NITROFURANTOIN (MONOHYDRATE/MACROCRYSTALS) SCH MG: 75; 25 CAPSULE ORAL at 21:06

## 2018-06-26 RX ADMIN — ACETAMINOPHEN SCH: 160 SOLUTION ORAL at 21:51

## 2018-06-26 RX ADMIN — Medication SCH ML: at 17:53

## 2018-06-26 RX ADMIN — METHYLPREDNISOLONE SODIUM SUCCINATE SCH MG: 40 INJECTION, POWDER, FOR SOLUTION INTRAMUSCULAR; INTRAVENOUS at 23:40

## 2018-06-26 RX ADMIN — CEFAZOLIN SCH MLS/HR: 330 INJECTION, POWDER, FOR SOLUTION INTRAMUSCULAR; INTRAVENOUS at 23:39

## 2018-06-26 RX ADMIN — LINAGLIPTIN SCH MG: 5 TABLET, FILM COATED ORAL at 08:41

## 2018-06-26 RX ADMIN — Medication SCH: at 20:03

## 2018-06-26 RX ADMIN — CHOLESTYRAMINE SCH GM: 4 POWDER, FOR SUSPENSION ORAL at 17:45

## 2018-06-26 RX ADMIN — INSULIN ASPART SCH UNIT: 100 INJECTION, SOLUTION INTRAVENOUS; SUBCUTANEOUS at 17:51

## 2018-06-26 RX ADMIN — Medication SCH ML: at 11:43

## 2018-06-26 RX ADMIN — ASPIRIN 81 MG CHEWABLE TABLET SCH MG: 81 TABLET CHEWABLE at 08:41

## 2018-06-26 RX ADMIN — INSULIN ASPART SCH UNIT: 100 INJECTION, SOLUTION INTRAVENOUS; SUBCUTANEOUS at 11:42

## 2018-06-26 RX ADMIN — METHYLPREDNISOLONE SODIUM SUCCINATE SCH MG: 40 INJECTION, POWDER, FOR SOLUTION INTRAMUSCULAR; INTRAVENOUS at 17:41

## 2018-06-26 RX ADMIN — ACETAMINOPHEN SCH ML: 160 SOLUTION ORAL at 08:41

## 2018-06-26 RX ADMIN — CHOLESTYRAMINE SCH GM: 4 POWDER, FOR SUSPENSION ORAL at 08:41

## 2018-06-26 RX ADMIN — Medication SCH ML: at 23:40

## 2018-06-26 RX ADMIN — LOPERAMIDE HYDROCHLORIDE SCH MG: 2 CAPSULE ORAL at 21:51

## 2018-06-26 RX ADMIN — ISOSORBIDE MONONITRATE SCH MG: 30 TABLET, EXTENDED RELEASE ORAL at 08:41

## 2018-06-26 RX ADMIN — NITROFURANTOIN (MONOHYDRATE/MACROCRYSTALS) SCH MG: 75; 25 CAPSULE ORAL at 08:46

## 2018-06-26 RX ADMIN — METOPROLOL SUCCINATE SCH MG: 25 TABLET, EXTENDED RELEASE ORAL at 08:45

## 2018-06-26 RX ADMIN — CHOLESTYRAMINE SCH GM: 4 POWDER, FOR SUSPENSION ORAL at 17:41

## 2018-06-26 RX ADMIN — METHYLPREDNISOLONE SODIUM SUCCINATE SCH MG: 40 INJECTION, POWDER, FOR SOLUTION INTRAMUSCULAR; INTRAVENOUS at 08:44

## 2018-06-26 RX ADMIN — Medication SCH ML: at 05:24

## 2018-06-26 RX ADMIN — ACETAMINOPHEN SCH ML: 160 SOLUTION ORAL at 17:41

## 2018-06-26 RX ADMIN — LISINOPRIL SCH MG: 20 TABLET ORAL at 08:41

## 2018-06-26 RX ADMIN — INSULIN ASPART SCH UNIT: 100 INJECTION, SOLUTION INTRAVENOUS; SUBCUTANEOUS at 21:06

## 2018-06-26 RX ADMIN — LOPERAMIDE HYDROCHLORIDE SCH MG: 2 CAPSULE ORAL at 17:40

## 2018-06-26 NOTE — P.DS
Providers


Date of admission: 


06/22/18 18:44





Expected date of discharge: 06/26/18


Attending physician: 


Eloise Willingham














Final diagnoses:


1.  Diarrhea, dehydration, generalized weakness, possibly secondary to 

chemotherapy effect





2.  Possible enteritis





3.  Dehydration present on admission





4.  Carotid stenosis, right 80%, left 30%





5.  Troponin 0.039, indeterminate on admission, possible non-STEMI





6.  Rectal cancer, on treatment





7.  Diabetes mellitus type 2

















Hospital course is a 78-year-old female admitted with continued diarrhea, 

dehydration, suspected GI bleed, carotid stenosis with history of rectal 

cancer.  Evaluated by oncology, GI and cardiology.  Maintained on gentle IV 

fluid hydration, scheduled Imodium.  Significant clinical improvement.  Cleared 

by a consults for discharge.  Patient is being discharged to [facility] subacute 

rehab in a stable condition with guarded prognosis.




















Exam: Gen. alert, oriented X3, no acute distress.CV: Regular S1 and [id number]: 

Bilateral bases diminishedABD: Soft, nontender, positive bowel soundsNeuro: No 

focal deficits.








The impression and plan of care has been dictated as directed.





.:


I performed a history and examination of this patient,  discussed the same with 

the dictator.  I agree with the dictator's note ,documented as a scribe.  Any 

additional findings or plans will be noted.











Time taken: 35 minutes


Consults: 





 





06/22/18 18:47


Consult Physician Routine 


   Consulting Provider: Sal Vega


   Consult Reason/Comments: NSTEMI


   Do you want consulting provider notified?: Yes


Consult Physician Routine 


   Consulting Provider: Tal Balderrama


   Consult Reason/Comments: intractable nausea/diarrhea


   Do you want consulting provider notified?: Yes





06/22/18 23:58


Consult Physician Routine 


   Consulting Provider: Demarco Huang


   Consult Reason/Comments: Rectal Cancer


   Do you want consulting provider notified?: Yes, Notify in am





06/23/18 14:17


Consult Physician Routine 


   Consulting Provider: Humberto Vila


   Consult Reason/Comments: cartoid stenosis


   Do you want consulting provider notified?: Yes





06/23/18 20:18


Consult Physician Routine 


   Consulting Provider: Olegario Sin


   Consult Reason/Comments: gi bleed


   Do you want consulting provider notified?: Yes











Primary care physician: 


Ricardo Lovell








Plan - Discharge Summary


Discharge Rx Participant: Yes


New Discharge Prescriptions: 


New


   Acetaminophen Oral Susp [Tylenol] 960 mg PO TID  cup


   Aspirin 81 mg PO DAILY  chew


   Cholestyramine (with Sugar) [Questran Packet] 4 gm PO TID BETWEEN MEALS  

packet


   diphenhydrAMINE ELIXIR [Benadryl Elixir] 75 mg PO TID  cup


   Isosorbide Mononitrate ER [Imdur] 15 mg PO DAILY  dose


   Lidocaine Viscous [Xylocaine Viscous 2%] 30 ml PO TID  ml


   Lisinopril [Zestril] 40 mg PO DAILY  tab


   Loperamide [Imodium] 2 mg PO TID #20 cap


   Mag Hydrox/Al Hydrox/Simeth [Maalox] 30 ml PO TID  cup


   Pantoprazole [Protonix] 40 mg PO AC-BRKFST  tablet.


   INSULIN LISPRO (HumaLOG) [humaLOG] 0 unit SQ ACHS #1 vial


   predniSONE 10 mg PO AS DIRECTED #30 tab





Continue


   Metoprolol Succinate 12.5 mg PO QAM


   sitaGLIPtin [Januvia] 100 mg PO DAILY


   Pravastatin Sodium [Pravachol] 40 mg PO HS


   Prochlorperazine [Compazine] 10 mg PO DAILY PRN


     PRN Reason: Nausea


   Ondansetron [Zofran ODT] 4 mg PO Q8HR PRN


     PRN Reason: Nausea


   Nitrofurantoin Monohyd/M-Cryst [Macrobid] 100 mg PO Q12HR





Discontinued


   Enalapril Maleate [Enalapril Maleate] 10 mg PO BID


Discharge Medication List





Metoprolol Succinate 12.5 mg PO QAM 03/21/16 [History]


sitaGLIPtin [Januvia] 100 mg PO DAILY 03/20/18 [History]


Pravastatin Sodium [Pravachol] 40 mg PO HS 04/13/18 [History]


Nitrofurantoin Monohyd/M-Cryst [Macrobid] 100 mg PO Q12HR 06/22/18 [History]


Ondansetron [Zofran ODT] 4 mg PO Q8HR PRN 06/22/18 [History]


Prochlorperazine [Compazine] 10 mg PO DAILY PRN 06/22/18 [History]


Acetaminophen Oral Susp [Tylenol] 960 mg PO TID  cup 06/26/18 [Rx]


Aspirin 81 mg PO DAILY  chew 06/26/18 [Rx]


Cholestyramine (with Sugar) [Questran Packet] 4 gm PO TID BETWEEN MEALS  packet 

06/26/18 [Rx]


INSULIN LISPRO (HumaLOG) [humaLOG] 0 unit SQ ACHS #1 vial 06/26/18 [Rx]


Isosorbide Mononitrate ER [Imdur] 15 mg PO DAILY  dose 06/26/18 [Rx]


Lidocaine Viscous [Xylocaine Viscous 2%] 30 ml PO TID  ml 06/26/18 [Rx]


Lisinopril [Zestril] 40 mg PO DAILY  tab 06/26/18 [Rx]


Loperamide [Imodium] 2 mg PO TID #20 cap 06/26/18 [Rx]


Mag Hydrox/Al Hydrox/Simeth [Maalox] 30 ml PO TID  cup 06/26/18 [Rx]


Pantoprazole [Protonix] 40 mg PO AC-BRKFST  tablet. 06/26/18 [Rx]


diphenhydrAMINE ELIXIR [Benadryl Elixir] 75 mg PO TID  cup 06/26/18 [Rx]


predniSONE 10 mg PO AS DIRECTED #30 tab 06/26/18 [Rx]








Follow up Appointment(s)/Referral(s): 


Sal Vega MD [STAFF PHYSICIAN] - 1 Week


Mitchell Lovell MD [Primary Care Provider] - 1 Week (After discharge from 

subacute rehab)


Aditya Maurer MD [STAFF PHYSICIAN] - 3 Days (While at subacute rehab)


Tal Balderrama MD [STAFF PHYSICIAN] - 07/10/18 12:15 pm


Activity/Diet/Wound Care/Special Instructions: 


[place]





Diet: Low fiber





Activity: As tolerated


CBC, BMP in 3 days

## 2018-06-26 NOTE — P.PN
Subjective


Progress Note Date: 06/26/18


Principal diagnosis: 





NSTEMI





Patient seen in follow-up, she continues to have diarrhea, less frequent, 

watery with some solid matter, she has more time to get to the bathroom, denies 

rectal pain or bleeding, oral irritation a little better, patient wants regular 

diet for lunch.  





Objective





- Vital Signs


Vital signs: 


 Vital Signs











Temp  97.1 F L  06/26/18 14:05


 


Pulse  74   06/26/18 15:29


 


Resp  16   06/26/18 15:29


 


BP  136/64   06/26/18 14:05


 


Pulse Ox  100   06/26/18 14:05








 Intake & Output











 06/25/18 06/26/18 06/26/18





 18:59 06:59 18:59


 


Intake Total 400 1240 


 


Output Total 2 200 204


 


Balance 398 1040 -204


 


Weight   86.3 kg


 


Intake:   


 


   400 


 


    Sodium Chloride 0.9% 1, 400 400 





    000 ml @ 50 mls/hr IV .   





    Q20H [facility] Rx#:[id number]   


 


  Oral  840 


 


Output:   


 


  Urine  200 200


 


  Stool 2  4


 


Other:   


 


  Voiding Method Toilet Toilet Toilet


 


  # Voids 1 2 2


 


  # Bowel Movements 1  1














- Constitutional


General appearance: Present: cooperative, no acute distress, obese





- EENT


EENT Comment(s): 





mild oral redness, no ulcerations or thrush


Eyes: Present: anicteric sclerae





- Respiratory


Details: 





respirations even and unlabored with speaking





- Gastrointestinal


Gastrointestinal Comment(s): 





minimal tenderness with moderate palpation of lower quadrants


General gastrointestinal: Present: normal bowel sounds, soft





- Integumentary


Integumentary: Present: pale





- Neurologic


Neurologic: Present: CNII-XII intact





- Musculoskeletal


Musculoskeletal: Present: generalized weakness





- Psychiatric


Psychiatric: Present: A&O x's 3, appropriate affect, intact judgment & insight





- Labs


CBC & Chem 7: 


 06/26/18 07:41





 06/26/18 07:41


Labs: 


 Abnormal Lab Results - Last 24 Hours (Table)











  06/25/18 06/25/18 06/26/18 Range/Units





  07:36 20:01 07:00 


 


WBC     (3.8-10.6)  k/uL


 


RBC     (3.80-5.40)  m/uL


 


Hgb     (11.4-16.0)  gm/dL


 


Hct     (34.0-46.0)  %


 


MCHC     (31.0-37.0)  g/dL


 


RDW     (11.5-15.5)  %


 


Lymphocytes #     (1.0-4.8)  k/uL


 


Chloride     ()  mmol/L


 


Carbon Dioxide     (22-30)  mmol/L


 


BUN     (7-17)  mg/dL


 


Glucose     (74-99)  mg/dL


 


POC Glucose (mg/dL)   135 H  164 H  (75-99)  mg/dL


 


Hemoglobin A1c  6.6 H    (4.0-6.0)  %














  06/26/18 06/26/18 06/26/18 Range/Units





  07:41 07:41 11:25 


 


WBC  2.3 L    (3.8-10.6)  k/uL


 


RBC  3.46 L    (3.80-5.40)  m/uL


 


Hgb  10.2 L    (11.4-16.0)  gm/dL


 


Hct  33.2 L    (34.0-46.0)  %


 


MCHC  30.7 L    (31.0-37.0)  g/dL


 


RDW  17.8 H    (11.5-15.5)  %


 


Lymphocytes #  0.3 L    (1.0-4.8)  k/uL


 


Chloride   112 H   ()  mmol/L


 


Carbon Dioxide   16 L   (22-30)  mmol/L


 


BUN   18 H   (7-17)  mg/dL


 


Glucose   153 H   (74-99)  mg/dL


 


POC Glucose (mg/dL)    170 H  (75-99)  mg/dL


 


Hemoglobin A1c     (4.0-6.0)  %








 Microbiology - Last 24 Hours (Table)











 06/26/18 04:30 Stool Culture - Preliminary





 Stool 














Assessment and Plan


(1) Palmar plantar erythrodysaesthesia due to cytotoxic therapy


Narrative/Plan: 


Stable, slow to improve, continue generous hydration and moisturizers


Current Visit: Yes   Status: Acute   Priority: High   Code(s): L27.1 - LOC SKIN 

ERUPTION DUE TO DRUGS AND MEDS TAKEN INTERNALLY; T45.1X5A - ADVERSE EFFECT OF 

ANTINEOPLASTIC AND IMMUNOSUP DRUGS, INIT   SNOMED Code(s): 229813708


   





(2) Diarrhea


Narrative/Plan: 


Slow to improve as well no infectious component,  antidiarrheals ordered.  

Anticipate improvement with ongoing holding of chemo


Current Visit: Yes   Status: Acute   Priority: High   Code(s): R19.7 - DIARRHEA

, UNSPECIFIED   SNOMED Code(s): 60093806


   





(3) Mucositis due to radiation therapy


Narrative/Plan: 


Slow to improve as well, continue salt and soda


Current Visit: Yes   Status: Acute   Priority: High   Code(s): K12.33 - ORAL 

MUCOSITIS (ULCERATIVE) DUE TO RADIATION   SNOMED Code(s): 515709165


   





(4) Pancytopenia due to antineoplastic chemotherapy


Narrative/Plan: 


stable, no acute intervention needed


Current Visit: Yes   Status: Acute   Priority: High   Code(s): D61.810 - 

ANTINEOPLASTIC CHEMOTHERAPY INDUCED PANCYTOPENIA; T45.1X5A - ADVERSE EFFECT OF 

ANTINEOPLASTIC AND IMMUNOSUP DRUGS, INIT   SNOMED Code(s): 156250163926359


   


Plan: 





Plan is for brief rehabilitation, she wants it so she can learn how to use her 

DME.





Case discussed with Primary Oncologist and Rad Onc.  Patient will resume 

treatment with Xeloda at a lower dose after she completes rehab.

## 2018-06-26 NOTE — P.PN
Subjective


Progress Note Date: 06/26/18


Principal diagnosis: 


Rectal cancer dehydration diarrhea


HIstory of rectal cancer admitted with dehydration diarrhea.  Passed 2 very 

small nonbloody loose bowel movement this morning.  Requesting diet advancement.











Objective





- Vital Signs


Vital signs: 


 Vital Signs











Temp  97.4 F L  06/26/18 05:30


 


Pulse  68   06/26/18 05:30


 


Resp  16   06/26/18 05:30


 


BP  115/64   06/26/18 05:30


 


Pulse Ox  99   06/26/18 05:30








 Intake & Output











 06/25/18 06/26/18 06/26/18





 18:59 06:59 18:59


 


Intake Total 400 1240 


 


Output Total 2 200 


 


Balance 398 1040 


 


Intake:   


 


   400 


 


    Sodium Chloride 0.9% 1, 400 400 





    000 ml @ 50 mls/hr IV .   





    Q20H [facility] Rx#:[id number]   


 


  Oral  840 


 


Output:   


 


  Urine  200 


 


  Stool 2  


 


Other:   


 


  Voiding Method Toilet Toilet 


 


  # Voids 1 2 


 


  # Bowel Movements 1  














- Exam


General appearance: The patient is alert, oriented, in no acute distress.


HET: Head is normocephalic and atraumatic.  Pupils are equal and reactive.  

Oropharynx is clear without lesions.


Neck: Supple without lymphadenopathy.  Trachea midline.


Heart: S1 S2.  Regular rate and rhythm.


Lungs: No crackles or wheezes are heard.


Abdomen: Soft, nontender, nondistended with  bowel sounds.  No peritoneal 

signs.  No palpable organomegaly or masses.


Extremities: Normal skin color and turgor.  No cyanosis, rash, ulceration, 

clubbing, or edema.  Radial and pedal pulses are 2/4 bilaterally.


Neurological: No focal deficits.  Strength and sensation are grossly intact.








- Labs


CBC & Chem 7: 


 06/26/18 07:41





 06/26/18 07:41


Labs: 


 Abnormal Lab Results - Last 24 Hours (Table)











  06/25/18 06/25/18 06/25/18 Range/Units





  07:36 17:20 20:01 


 


WBC     (3.8-10.6)  k/uL


 


RBC     (3.80-5.40)  m/uL


 


Hgb     (11.4-16.0)  gm/dL


 


Hct     (34.0-46.0)  %


 


MCHC     (31.0-37.0)  g/dL


 


RDW     (11.5-15.5)  %


 


Lymphocytes #     (1.0-4.8)  k/uL


 


Chloride     ()  mmol/L


 


Carbon Dioxide     (22-30)  mmol/L


 


BUN     (7-17)  mg/dL


 


Glucose     (74-99)  mg/dL


 


POC Glucose (mg/dL)   141 H  135 H  (75-99)  mg/dL


 


Hemoglobin A1c  6.6 H    (4.0-6.0)  %














  06/26/18 06/26/18 06/26/18 Range/Units





  07:00 07:41 07:41 


 


WBC   2.3 L   (3.8-10.6)  k/uL


 


RBC   3.46 L   (3.80-5.40)  m/uL


 


Hgb   10.2 L   (11.4-16.0)  gm/dL


 


Hct   33.2 L   (34.0-46.0)  %


 


MCHC   30.7 L   (31.0-37.0)  g/dL


 


RDW   17.8 H   (11.5-15.5)  %


 


Lymphocytes #   0.3 L   (1.0-4.8)  k/uL


 


Chloride    112 H  ()  mmol/L


 


Carbon Dioxide    16 L  (22-30)  mmol/L


 


BUN    18 H  (7-17)  mg/dL


 


Glucose    153 H  (74-99)  mg/dL


 


POC Glucose (mg/dL)  164 H    (75-99)  mg/dL


 


Hemoglobin A1c     (4.0-6.0)  %














Assessment and Plan


(1) Rectal cancer


Current Visit: Yes   Status: Acute   Code(s): C20 - MALIGNANT NEOPLASM OF 

RECTUM   SNOMED Code(s): 203425304


   





(2) Diarrhea


Current Visit: Yes   Status: Acute   Priority: High   Code(s): R19.7 - DIARRHEA

, UNSPECIFIED   SNOMED Code(s): 42856464


   





(3) Mucositis due to radiation therapy


Current Visit: Yes   Status: Acute   Priority: High   Code(s): K12.33 - ORAL 

MUCOSITIS (ULCERATIVE) DUE TO RADIATION   SNOMED Code(s): 959495927


   


Plan: 


1.  Low residue diet.  Ensure 3 times a day with meals.  Imodium 2 mg 3 times a 

day schedule.  We'll continue to follow.  Supportive measures.








Assessment and plan a care discussed with Dr. Horta

## 2018-06-27 LAB
ANION GAP SERPL CALC-SCNC: 8 MMOL/L
BASOPHILS # BLD AUTO: 0 K/UL (ref 0–0.2)
BASOPHILS NFR BLD AUTO: 0 %
BUN SERPL-SCNC: 22 MG/DL (ref 7–17)
CALCIUM SPEC-MCNC: 8 MG/DL (ref 8.4–10.2)
CHLORIDE SERPL-SCNC: 114 MMOL/L (ref 98–107)
CO2 SERPL-SCNC: 18 MMOL/L (ref 22–30)
EOSINOPHIL # BLD AUTO: 0 K/UL (ref 0–0.7)
EOSINOPHIL NFR BLD AUTO: 0 %
ERYTHROCYTE [DISTWIDTH] IN BLOOD BY AUTOMATED COUNT: 3.11 M/UL (ref 3.8–5.4)
ERYTHROCYTE [DISTWIDTH] IN BLOOD: 18.2 % (ref 11.5–15.5)
GLUCOSE BLD-MCNC: 127 MG/DL (ref 75–99)
GLUCOSE BLD-MCNC: 143 MG/DL (ref 75–99)
GLUCOSE BLD-MCNC: 145 MG/DL (ref 75–99)
GLUCOSE BLD-MCNC: 197 MG/DL (ref 75–99)
GLUCOSE SERPL-MCNC: 186 MG/DL (ref 74–99)
HCT VFR BLD AUTO: 29.2 % (ref 34–46)
HGB BLD-MCNC: 9.3 GM/DL (ref 11.4–16)
LYMPHOCYTES # SPEC AUTO: 0.2 K/UL (ref 1–4.8)
LYMPHOCYTES NFR SPEC AUTO: 6 %
MCH RBC QN AUTO: 30 PG (ref 25–35)
MCHC RBC AUTO-ENTMCNC: 31.9 G/DL (ref 31–37)
MCV RBC AUTO: 93.9 FL (ref 80–100)
MONOCYTES # BLD AUTO: 0.3 K/UL (ref 0–1)
MONOCYTES NFR BLD AUTO: 13 %
NEUTROPHILS # BLD AUTO: 1.9 K/UL (ref 1.3–7.7)
NEUTROPHILS NFR BLD AUTO: 78 %
PLATELET # BLD AUTO: 202 K/UL (ref 150–450)
POTASSIUM SERPL-SCNC: 4.2 MMOL/L (ref 3.5–5.1)
SODIUM SERPL-SCNC: 140 MMOL/L (ref 137–145)
WBC # BLD AUTO: 2.4 K/UL (ref 3.8–10.6)

## 2018-06-27 RX ADMIN — LINAGLIPTIN SCH: 5 TABLET, FILM COATED ORAL at 11:46

## 2018-06-27 RX ADMIN — PRAVASTATIN SODIUM SCH MG: 40 TABLET ORAL at 20:53

## 2018-06-27 RX ADMIN — LOPERAMIDE HYDROCHLORIDE SCH MG: 2 CAPSULE ORAL at 15:16

## 2018-06-27 RX ADMIN — METHYLPREDNISOLONE SODIUM SUCCINATE SCH: 40 INJECTION, POWDER, FOR SOLUTION INTRAMUSCULAR; INTRAVENOUS at 11:45

## 2018-06-27 RX ADMIN — ACETAMINOPHEN SCH ML: 160 SOLUTION ORAL at 15:22

## 2018-06-27 RX ADMIN — INSULIN ASPART SCH: 100 INJECTION, SOLUTION INTRAVENOUS; SUBCUTANEOUS at 11:45

## 2018-06-27 RX ADMIN — ISOSORBIDE MONONITRATE SCH: 30 TABLET, EXTENDED RELEASE ORAL at 11:46

## 2018-06-27 RX ADMIN — LOPERAMIDE HYDROCHLORIDE SCH: 2 CAPSULE ORAL at 11:55

## 2018-06-27 RX ADMIN — INSULIN ASPART SCH: 100 INJECTION, SOLUTION INTRAVENOUS; SUBCUTANEOUS at 17:23

## 2018-06-27 RX ADMIN — ACETAMINOPHEN SCH ML: 160 SOLUTION ORAL at 20:54

## 2018-06-27 RX ADMIN — CHOLESTYRAMINE SCH GM: 4 POWDER, FOR SUSPENSION ORAL at 15:17

## 2018-06-27 RX ADMIN — Medication SCH ML: at 15:21

## 2018-06-27 RX ADMIN — Medication SCH: at 11:45

## 2018-06-27 RX ADMIN — METHYLPREDNISOLONE SODIUM SUCCINATE SCH: 40 INJECTION, POWDER, FOR SOLUTION INTRAMUSCULAR; INTRAVENOUS at 23:19

## 2018-06-27 RX ADMIN — INSULIN ASPART SCH UNIT: 100 INJECTION, SOLUTION INTRAVENOUS; SUBCUTANEOUS at 13:17

## 2018-06-27 RX ADMIN — PANTOPRAZOLE SODIUM SCH: 40 TABLET, DELAYED RELEASE ORAL at 11:45

## 2018-06-27 RX ADMIN — Medication SCH ML: at 20:54

## 2018-06-27 RX ADMIN — METHYLPREDNISOLONE SODIUM SUCCINATE SCH MG: 40 INJECTION, POWDER, FOR SOLUTION INTRAMUSCULAR; INTRAVENOUS at 15:17

## 2018-06-27 RX ADMIN — NITROFURANTOIN (MONOHYDRATE/MACROCRYSTALS) SCH MG: 75; 25 CAPSULE ORAL at 20:54

## 2018-06-27 RX ADMIN — CEFAZOLIN SCH: 330 INJECTION, POWDER, FOR SOLUTION INTRAMUSCULAR; INTRAVENOUS at 22:04

## 2018-06-27 RX ADMIN — IOPAMIDOL PRN ML: 612 INJECTION, SOLUTION INTRAVENOUS at 16:19

## 2018-06-27 RX ADMIN — NITROFURANTOIN (MONOHYDRATE/MACROCRYSTALS) SCH: 75; 25 CAPSULE ORAL at 11:46

## 2018-06-27 RX ADMIN — ASPIRIN 81 MG CHEWABLE TABLET SCH: 81 TABLET CHEWABLE at 11:46

## 2018-06-27 RX ADMIN — CHOLESTYRAMINE SCH: 4 POWDER, FOR SUSPENSION ORAL at 17:25

## 2018-06-27 RX ADMIN — LISINOPRIL SCH: 20 TABLET ORAL at 11:46

## 2018-06-27 RX ADMIN — INSULIN ASPART SCH: 100 INJECTION, SOLUTION INTRAVENOUS; SUBCUTANEOUS at 20:55

## 2018-06-27 RX ADMIN — Medication SCH ML: at 13:21

## 2018-06-27 RX ADMIN — CHOLESTYRAMINE SCH: 4 POWDER, FOR SUSPENSION ORAL at 11:48

## 2018-06-27 RX ADMIN — METOPROLOL SUCCINATE SCH: 25 TABLET, EXTENDED RELEASE ORAL at 11:46

## 2018-06-27 RX ADMIN — IOPAMIDOL PRN ML: 612 INJECTION, SOLUTION INTRAVENOUS at 17:23

## 2018-06-27 RX ADMIN — LOPERAMIDE HYDROCHLORIDE SCH MG: 2 CAPSULE ORAL at 20:56

## 2018-06-27 RX ADMIN — Medication SCH: at 23:19

## 2018-06-27 RX ADMIN — ACETAMINOPHEN SCH: 160 SOLUTION ORAL at 11:47

## 2018-06-27 NOTE — PN
PROGRESS NOTE



DATE OF SERVICE:

06/26/2018



This 78-year-old woman was admitted with continued diarrhea possibly secondary to

chemotherapy is still having significant diarrhea.  No chest pain.  No palpitations.

No fever.



EXAM:

Alert and oriented x3.  The pulse is 74. Blood pressure 130/62, respirations 16,

temperature 97.2.  Pulse ox 100% on room air.  HEENT: Conjunctivae normal.

NECK: No jugular venous distention.

CARDIOVASCULAR: S1, S2 muffled.

Respirations: Breath sounds diminished in the bases.  Scattered rhonchi.  No crackles.

ABDOMEN is soft, nontender.

LEGS are no edema. No swelling.

CENTRAL NERVOUS SYSTEM: No focal deficits.



LABS:

WBC 2.2, hemoglobin 10.2.  Other labs are noted.



ASSESSMENT:

1. Continued diarrhea, weakness, dehydration, possibly secondary to chemotherapy

    effect.

2. Possible enteritis.

3. Possible lower gastrointestinal bleed.

4. Dehydration, present on admission.

5. Carotid stenosis right 80%, left 30% stenosis.

6. Troponin 0.03, indeterminate on admission possible acute non ST-segment elevation

    myocardial infarction.

7. History of rectal cancer on treatment.

8. Diabetes type 2.

9. Hypertension.

10.Hyperlipidemia.

11.History of cholecystectomy.

12.FULL CODE.



RECOMMENDATIONS AND DISCUSSION:

Recommend to continue current medications, management and symptomatic treatment.

Continue with empiric steroids.  Continue the rest of medications.  Prognosis guarded

because of multiple complex medical issues. Further recommendations to follow.





MMODL / IJN: [id number] / Job#: [id number]

## 2018-06-27 NOTE — P.PN
Subjective


Progress Note Date: 06/27/18


Principal diagnosis: 


Rectal cancer dehydration diarrhea


History of rectal cancer admitted with dehydration diarrhea.  Diarrhea somewhat 

improved yesterday afternoon but increased overnight. Tolerating a soft diet. 





Objective





- Vital Signs


Vital signs: 


 Vital Signs











Temp  97.2 F L  06/26/18 21:30


 


Pulse  75   06/26/18 21:30


 


Resp  16   06/27/18 00:00


 


BP  131/68   06/26/18 21:30


 


Pulse Ox  97   06/26/18 21:30








 Intake & Output











 06/26/18 06/27/18 06/27/18





 18:59 06:59 18:59


 


Intake Total  1000 


 


Output Total 204  


 


Balance -204 1000 


 


Weight 86.3 kg 86.3 kg 


 


Intake:   


 


  Intake, IV Titration  400 





  Amount   


 


    Sodium Chloride 0.9% 1,  400 





    000 ml @ 50 mls/hr IV .   





    Q20H [facility] Rx#:[id number]   


 


  Oral  600 


 


Output:   


 


  Urine 200  


 


  Stool 4  


 


Other:   


 


  Voiding Method Toilet Toilet 


 


  # Voids 2 1 


 


  # Bowel Movements 1 1 














- Exam


General appearance: The patient is alert, oriented, in no acute distress.


HET: Head is normocephalic and atraumatic.  Pupils are equal and reactive.  

Oropharynx is clear without lesions.


Neck: Supple without lymphadenopathy.  Trachea midline.


Heart: S1 S2.  Regular rate and rhythm.


Lungs: No crackles or wheezes are heard.


Abdomen: Soft, nontender, nondistended with  bowel sounds.  No peritoneal 

signs.  No palpable organomegaly or masses.


Extremities: Normal skin color and turgor.  No cyanosis, rash, ulceration, 

clubbing, or edema.  Radial and pedal pulses are 2/4 bilaterally.


Neurological: No focal deficits.  Strength and sensation are grossly intact.








- Labs


CBC & Chem 7: 


 06/27/18 05:25





 06/26/18 07:41


Labs: 


 Abnormal Lab Results - Last 24 Hours (Table)











  06/26/18 06/27/18 06/27/18 Range/Units





  20:21 05:25 07:19 


 


WBC   2.4 L   (3.8-10.6)  k/uL


 


RBC   3.11 L   (3.80-5.40)  m/uL


 


Hgb   9.3 L   (11.4-16.0)  gm/dL


 


Hct   29.2 L   (34.0-46.0)  %


 


RDW   18.2 H   (11.5-15.5)  %


 


Lymphocytes #   0.2 L   (1.0-4.8)  k/uL


 


POC Glucose (mg/dL)  186 H   197 H  (75-99)  mg/dL








 Microbiology - Last 24 Hours (Table)











 06/26/18 04:30 Stool Culture - Preliminary





 Stool 














Assessment and Plan


(1) Rectal cancer


Current Visit: Yes   Status: Acute   Code(s): C20 - MALIGNANT NEOPLASM OF 

RECTUM   SNOMED Code(s): 115248081


   





(2) Diarrhea


Current Visit: Yes   Status: Acute   Priority: High   Code(s): R19.7 - DIARRHEA

, UNSPECIFIED   SNOMED Code(s): 89733382


   





(3) Mucositis due to radiation therapy


Current Visit: Yes   Status: Acute   Priority: High   Code(s): K12.33 - ORAL 

MUCOSITIS (ULCERATIVE) DUE TO RADIATION   SNOMED Code(s): 734352199


   


Plan: 


1.  Low residue diet.  Ensure 3 times a day with meals.  Increase Imodium 4 mg 

3 times a day scheduled.  We'll continue to follow.  Supportive measures.








Assessment and plan a care discussed with Dr. Horta

## 2018-06-27 NOTE — CT
EXAMINATION TYPE: CT abdomen pelvis wo con

 

DATE OF EXAM: 6/27/2018

 

COMPARISON: Multilevel degenerative spondylosis changes are noted, particularly advanced at C5-6. Not
 visualized. Evidently the lateral one and, other than a small volume of cul-de-sac fluid which and t
iny gas bubbles within the vaginal fornices definite incidental

 

HISTORY: Patient poor historian; diarrhea

 

CT DLP: 1055 mGycm. Automated exposure control for dose reduction was used.

 

TECHNIQUE:  Helical acquisition of images was performed from the lung bases through the pelvis.

 

FINDINGS: 

VISUALIZED LOWER THORAX: Prominent coronary calcifications and mild plus cardiomegaly noted.

 

LIVER/GB: No significant abnormality is appreciated.

 

PANCREAS: No significant abnormality is seen.

 

SPLEEN: No significant abnormality is seen.

 

ADRENALS: No significant abnormality is seen.

 

KIDNEYS: No significant abnormality is seen.

 

FREE AIR:  No free air is visualized

 

RETROPERITONEAL ADENOPATHY:  None visualized

 

REPRODUCTIVE ORGANS: No significant abnormality is seen

 

URINARY BLADDER:  No significant abnormality is seen.

 

PELVIC ADENOPATHY:  None visualized.

 

OSSEOUS STRUCTURES:  No significant abnormality is seen.

 

BOWEL:  Prominent pattern of diverticulosis throughout the sigmoid and descending colon, with mild sp
ecific pericolonic edematous reticulation within the sigmoid mesocolon. 

 

IMPRESSION: 

NO DEFINITE ACUTE PROCESS, CT WITHOUT INTRAVENOUS CONTRAST.

## 2018-06-27 NOTE — P.PN
Subjective


Progress Note Date: 06/27/18


Principal diagnosis: 





NSTEMI





Pt seen in f/u today.  She had incontinence of stool this AM, denied knowledge 

of black or bloody stool, stool culture is pending.  She started solid foods 

yesterday, denied nausea, vomiting, indigestion, heartburn, abd pain or 

bloating.  Her mouth continues to be tender/irritated, the skin on her hands 

and feet is less red and dry, mild sensitivity.  





Objective





- Vital Signs


Vital signs: 


 Vital Signs











Temp  97.2 F L  06/26/18 21:30


 


Pulse  75   06/26/18 21:30


 


Resp  16   06/27/18 00:00


 


BP  131/68   06/26/18 21:30


 


Pulse Ox  97   06/26/18 21:30








 Intake & Output











 06/26/18 06/27/18 06/27/18





 18:59 06:59 18:59


 


Intake Total  1000 


 


Output Total 204  


 


Balance -204 1000 


 


Weight 86.3 kg 86.3 kg 


 


Intake:   


 


  Intake, IV Titration  400 





  Amount   


 


    Sodium Chloride 0.9% 1,  400 





    000 ml @ 50 mls/hr IV .   





    Q20H [facility] Rx#:[id number]   


 


  Oral  600 


 


Output:   


 


  Urine 200  


 


  Stool 4  


 


Other:   


 


  Voiding Method Toilet Toilet 


 


  # Voids 2 1 


 


  # Bowel Movements 1 1 














- Constitutional


General appearance: Present: average body habitus, no acute distress, obese





- EENT


Eyes: Present: anicteric sclerae


ENT: Present: pharyngeal erythema





- Respiratory


Respiratory: bilateral: CTA





- Cardiovascular


Heart sounds: normal: S1, S2





- Gastrointestinal


General gastrointestinal: Present: normal bowel sounds, soft.  Absent: absent 

bowel sounds, decreased bowel sounds, distended, hepatomegaly, hyperactive 

bowel sounds, organomegaly, rigid, scaphoid, splenomegaly, tenderness, 

umbilical hernia, ventral hernia





- Integumentary


Integumentary: Present: pale





- Neurologic


Neurologic: Present: CNII-XII intact





- Musculoskeletal


Musculoskeletal: Present: generalized weakness





- Psychiatric


Psychiatric: Present: A&O x's 3, appropriate affect, intact judgment & insight





- Labs


CBC & Chem 7: 


 06/27/18 05:25





 06/26/18 07:41


Labs: 


 Abnormal Lab Results - Last 24 Hours (Table)











  06/26/18 06/27/18 06/27/18 Range/Units





  20:21 05:25 07:19 


 


WBC   2.4 L   (3.8-10.6)  k/uL


 


RBC   3.11 L   (3.80-5.40)  m/uL


 


Hgb   9.3 L   (11.4-16.0)  gm/dL


 


Hct   29.2 L   (34.0-46.0)  %


 


RDW   18.2 H   (11.5-15.5)  %


 


Lymphocytes #   0.2 L   (1.0-4.8)  k/uL


 


POC Glucose (mg/dL)  186 H   197 H  (75-99)  mg/dL














  06/27/18 Range/Units





  11:47 


 


WBC   (3.8-10.6)  k/uL


 


RBC   (3.80-5.40)  m/uL


 


Hgb   (11.4-16.0)  gm/dL


 


Hct   (34.0-46.0)  %


 


RDW   (11.5-15.5)  %


 


Lymphocytes #   (1.0-4.8)  k/uL


 


POC Glucose (mg/dL)  143 H  (75-99)  mg/dL








 Microbiology - Last 24 Hours (Table)











 06/26/18 04:30 Stool Culture - Preliminary





 Stool 














Assessment and Plan


(1) Palmar plantar erythrodysaesthesia due to cytotoxic therapy


Narrative/Plan: 


Some improvement, cont supportive care


Current Visit: Yes   Status: Acute   Priority: High   Code(s): L27.1 - LOC SKIN 

ERUPTION DUE TO DRUGS AND MEDS TAKEN INTERNALLY; T45.1X5A - ADVERSE EFFECT OF 

ANTINEOPLASTIC AND IMMUNOSUP DRUGS, INIT   SNOMED Code(s): 470140345


   





(2) Diarrhea


Narrative/Plan: 


Stool culture pending, imodium ordered at this time. If culture returns negative

, more aggressive treatment with addition of lomotil can be prescribed.  


CHRISTY diet recommended.  


Current Visit: Yes   Status: Acute   Priority: High   Code(s): R19.7 - DIARRHEA

, UNSPECIFIED   SNOMED Code(s): 81638013


   





(3) Mucositis due to radiation therapy


Narrative/Plan: 


Continue supportive medications, diligent mouth care, softer foods to reduce 

irritation. 


Current Visit: Yes   Status: Acute   Priority: High   Code(s): K12.33 - ORAL 

MUCOSITIS (ULCERATIVE) DUE TO RADIATION   SNOMED Code(s): 258643835


   





(4) Pancytopenia due to antineoplastic chemotherapy


Narrative/Plan: 


Platelets normal, adequate ANC, Hgb has been labile, continue to monitor CBC, 

no need for transfusion at this time.  


Current Visit: Yes   Status: Acute   Priority: High   Code(s): D61.810 - 

ANTINEOPLASTIC CHEMOTHERAPY INDUCED PANCYTOPENIA; T45.1X5A - ADVERSE EFFECT OF 

ANTINEOPLASTIC AND IMMUNOSUP DRUGS, INIT   SNOMED Code(s): 037337735394571


   


Plan: 





Agree with brief rehabilitation so pt can learn how to use her DME and be more 

active at home.





Patient will resume treatment with Xeloda at a lower dose after she completes 

rehab, this was discussed with her.  She sees Dr. Balderrama on the 10th and will be 

cleared to resume if appropriate at that time.

## 2018-06-27 NOTE — PN
PROGRESS NOTE



DATE OF SERVICE:

06/27/2018



This 78-year-old woman with a past medical history of multiple medical problems was

admitted with diarrhea. The diarrhea was thought to be secondary to chemotherapy

recently. Patient was started on empiric IV steroids. The patient is still having

diarrhea.  ECF rehab is also being planned.  No chest pain.  No palpitations.  No

fever.



On exam, alert and oriented x3. Pulse 67, blood pressure 130/62, respiratory rate 20,

temperature 94.3, pulse ox 100% on room air.

HEENT: Conjunctivae normal. Oral mucosa moist.

NECK: No jugular venous distention. No carotid bruit. No lymph node enlargement.

CARDIOVASCULAR SYSTEM:  S1, S2 muffled.

RESPIRATORY SYSTEM: Breath sounds diminished at the bases.  A few scattered rhonchi. No

crackles.

ABDOMEN:  Soft, nontender.  No mass palpable.

LEGS: No edema. No swelling.

NERVOUS SYSTEM: Higher functions as mentioned earlier. Moves all 4 limbs. No focal

motor or sensory deficit.

LYMPHATICS: No lymph node palpable in neck, axillae or groin.

SKIN: No ulcer, rash, bleeding.



LABS: WBC 2.4, hemoglobin 9.3. Other labs are noted.



ASSESSMENT:

1. Continued diarrhea, weakness, dehydration, possibly secondary to chemotherapy

    effect.

2. Possible enteritis.

3. Lower gastrointestinal bleed.

4. Dehydration, present on admission.

5. Carotid stenosis; 80% on the left and 30% on the right.

6. Troponin 0.03, indeterminate on admission; possible acute  non-ST-segment-elevation

    myocardial infarction.

7. History of rectal cancer.

8. Diabetes mellitus, type 2.

9. Hypertension.

10.Hyperlipidemia.

11.History of cholecystectomy.

12.FULL CODE.



RECOMMENDATIONS AND DISCUSSION:

I recommend to continue current medication, continue symptomatic treatment. At this

time I would recommend continuing with hydration.  Prognosis guarded because of

multiple complex medical issues. Further recommendations to follow.





MMODL / IJN: [id number] / Job#: [id number]

## 2018-06-28 LAB
ANION GAP SERPL CALC-SCNC: 9 MMOL/L
BASOPHILS # BLD AUTO: 0 K/UL (ref 0–0.2)
BASOPHILS NFR BLD AUTO: 0 %
BUN SERPL-SCNC: 27 MG/DL (ref 7–17)
CALCIUM SPEC-MCNC: 7.8 MG/DL (ref 8.4–10.2)
CHLORIDE SERPL-SCNC: 114 MMOL/L (ref 98–107)
CO2 SERPL-SCNC: 17 MMOL/L (ref 22–30)
EOSINOPHIL # BLD AUTO: 0.1 K/UL (ref 0–0.7)
EOSINOPHIL NFR BLD AUTO: 4 %
ERYTHROCYTE [DISTWIDTH] IN BLOOD BY AUTOMATED COUNT: 3.07 M/UL (ref 3.8–5.4)
ERYTHROCYTE [DISTWIDTH] IN BLOOD: 18.5 % (ref 11.5–15.5)
GLUCOSE BLD-MCNC: 128 MG/DL (ref 75–99)
GLUCOSE BLD-MCNC: 146 MG/DL (ref 75–99)
GLUCOSE BLD-MCNC: 193 MG/DL (ref 75–99)
GLUCOSE BLD-MCNC: 195 MG/DL (ref 75–99)
GLUCOSE SERPL-MCNC: 145 MG/DL (ref 74–99)
HCT VFR BLD AUTO: 29 % (ref 34–46)
HGB BLD-MCNC: 9.4 GM/DL (ref 11.4–16)
LYMPHOCYTES # SPEC AUTO: 0.3 K/UL (ref 1–4.8)
LYMPHOCYTES NFR SPEC AUTO: 10 %
MCH RBC QN AUTO: 30.7 PG (ref 25–35)
MCHC RBC AUTO-ENTMCNC: 32.5 G/DL (ref 31–37)
MCV RBC AUTO: 94.4 FL (ref 80–100)
MONOCYTES # BLD AUTO: 0.4 K/UL (ref 0–1)
MONOCYTES NFR BLD AUTO: 14 %
NEUTROPHILS # BLD AUTO: 1.8 K/UL (ref 1.3–7.7)
NEUTROPHILS NFR BLD AUTO: 69 %
PLATELET # BLD AUTO: 185 K/UL (ref 150–450)
POTASSIUM SERPL-SCNC: 3.6 MMOL/L (ref 3.5–5.1)
SODIUM SERPL-SCNC: 140 MMOL/L (ref 137–145)
WBC # BLD AUTO: 2.6 K/UL (ref 3.8–10.6)

## 2018-06-28 RX ADMIN — LOPERAMIDE HYDROCHLORIDE SCH MG: 2 CAPSULE ORAL at 21:39

## 2018-06-28 RX ADMIN — INSULIN ASPART SCH UNIT: 100 INJECTION, SOLUTION INTRAVENOUS; SUBCUTANEOUS at 07:52

## 2018-06-28 RX ADMIN — PRAVASTATIN SODIUM SCH MG: 40 TABLET ORAL at 07:51

## 2018-06-28 RX ADMIN — CHOLESTYRAMINE SCH GM: 4 POWDER, FOR SUSPENSION ORAL at 17:57

## 2018-06-28 RX ADMIN — PANTOPRAZOLE SODIUM SCH MG: 40 TABLET, DELAYED RELEASE ORAL at 07:51

## 2018-06-28 RX ADMIN — LINAGLIPTIN SCH MG: 5 TABLET, FILM COATED ORAL at 07:51

## 2018-06-28 RX ADMIN — ACETAMINOPHEN SCH ML: 160 SOLUTION ORAL at 21:39

## 2018-06-28 RX ADMIN — LOPERAMIDE HYDROCHLORIDE SCH MG: 2 CAPSULE ORAL at 12:37

## 2018-06-28 RX ADMIN — METHYLPREDNISOLONE SODIUM SUCCINATE SCH: 40 INJECTION, POWDER, FOR SOLUTION INTRAMUSCULAR; INTRAVENOUS at 07:47

## 2018-06-28 RX ADMIN — INSULIN ASPART SCH UNIT: 100 INJECTION, SOLUTION INTRAVENOUS; SUBCUTANEOUS at 17:57

## 2018-06-28 RX ADMIN — LISINOPRIL SCH MG: 20 TABLET ORAL at 07:51

## 2018-06-28 RX ADMIN — Medication SCH: at 06:07

## 2018-06-28 RX ADMIN — CHOLESTYRAMINE SCH GM: 4 POWDER, FOR SUSPENSION ORAL at 09:41

## 2018-06-28 RX ADMIN — LOPERAMIDE HYDROCHLORIDE SCH MG: 2 CAPSULE ORAL at 17:33

## 2018-06-28 RX ADMIN — CHOLESTYRAMINE SCH GM: 4 POWDER, FOR SUSPENSION ORAL at 15:32

## 2018-06-28 RX ADMIN — ACETAMINOPHEN SCH ML: 160 SOLUTION ORAL at 07:51

## 2018-06-28 RX ADMIN — Medication SCH ML: at 09:41

## 2018-06-28 RX ADMIN — INSULIN ASPART SCH: 100 INJECTION, SOLUTION INTRAVENOUS; SUBCUTANEOUS at 10:54

## 2018-06-28 RX ADMIN — ACETAMINOPHEN SCH ML: 160 SOLUTION ORAL at 15:30

## 2018-06-28 RX ADMIN — NITROFURANTOIN (MONOHYDRATE/MACROCRYSTALS) SCH MG: 75; 25 CAPSULE ORAL at 07:51

## 2018-06-28 RX ADMIN — ASPIRIN 81 MG CHEWABLE TABLET SCH MG: 81 TABLET CHEWABLE at 07:51

## 2018-06-28 RX ADMIN — INSULIN ASPART SCH UNIT: 100 INJECTION, SOLUTION INTRAVENOUS; SUBCUTANEOUS at 21:39

## 2018-06-28 RX ADMIN — LOPERAMIDE HYDROCHLORIDE SCH MG: 2 CAPSULE ORAL at 07:51

## 2018-06-28 RX ADMIN — Medication SCH ML: at 20:03

## 2018-06-28 RX ADMIN — METOPROLOL SUCCINATE SCH MG: 25 TABLET, EXTENDED RELEASE ORAL at 07:50

## 2018-06-28 RX ADMIN — CEFAZOLIN SCH: 330 INJECTION, POWDER, FOR SOLUTION INTRAMUSCULAR; INTRAVENOUS at 17:33

## 2018-06-28 RX ADMIN — ISOSORBIDE MONONITRATE SCH MG: 30 TABLET, EXTENDED RELEASE ORAL at 07:51

## 2018-06-28 RX ADMIN — Medication SCH ML: at 15:32

## 2018-06-28 NOTE — P.PN
Subjective





Patient was admitted secondary to diarrhea related to chemotherapy and 

mucositis.  Patient's creatinine is around 0.9 today still had 3 episodes of 

diarrhea today.  Patient was ruled out Clostridium difficile colitis.  Patient 

will need to be discharged to subacute rehabilitation.  Because of her diarrhea 

will hold her discharged today which appears to be improving.  Patient the was 

on nitrofurantoin for possible urinary tract infection in the past patient 

received an of days of antibiotic this will be discontinued which may help her 

diarrhea.





Constitutional: Denied any fatigue denied any fever.


Cardio vascular: denied any chest pain, palpitations


Gastrointestinal denied any nausea vomiting


Pulmonary: Denied any shortness of breath cough


Neurologic denied any new focal deficits





Objective





- Vital Signs


Vital signs: 


 Vital Signs











Temp  97.7 F   06/28/18 05:00


 


Pulse  83   06/28/18 05:00


 


Resp  16   06/28/18 05:00


 


BP  116/64   06/28/18 05:00


 


Pulse Ox  95   06/28/18 05:00








 Intake & Output











 06/27/18 06/28/18 06/28/18





 18:59 06:59 18:59


 


Intake Total 350 590 


 


Output Total 206  


 


Balance 144 590 


 


Weight 86.3 kg  


 


Intake:   


 


  Intake, IV Titration 350  





  Amount   


 


    Sodium Chloride 0.9% 1, 350  





    000 ml @ 50 mls/hr IV .   





    Q20H [facility] Rx#:[id number]   


 


  Oral  590 


 


Output:   


 


  Urine 200  


 


  Stool 6  


 


Other:   


 


  Voiding Method Toilet Diaper 





  Incontinent 


 


  # Voids 1 3 


 


  # Bowel Movements  3 














- Exam


PHYSICAL EXAMINATION: 





GENERAL: The patient is alert and oriented x3, not in any acute distress.  Thin 

built appears to have malaise


HEENT: Pupils are round and equally reacting to light. EOMI. No scleral 

icterus. No conjunctival pallor. Normocephalic, atraumatic. No pharyngeal 

erythema. No thyromegaly. 


CARDIOVASCULAR: S1 and S2 present. No murmurs, rubs, or gallops. 


PULMONARY: Chest is clear to auscultation, no wheezing or crackles. 


ABDOMEN: Soft, nontender, nondistended, normoactive bowel sounds. No palpable 

organomegaly. 


MUSCULOSKELETAL: No joint swelling or deformity.


EXTREMITIES: No cyanosis, clubbing, or pedal edema. 


NEUROLOGICAL: Gross neurological examination did not reveal any focal deficits. 


SKIN: No rashes. 











- Labs


CBC & Chem 7: 


 06/28/18 06:53





 06/28/18 06:53


Labs: 


 Abnormal Lab Results - Last 24 Hours (Table)











  06/27/18 06/27/18 06/27/18 Range/Units





  05:25 11:47 16:48 


 


WBC     (3.8-10.6)  k/uL


 


RBC     (3.80-5.40)  m/uL


 


Hgb     (11.4-16.0)  gm/dL


 


Hct     (34.0-46.0)  %


 


RDW     (11.5-15.5)  %


 


Lymphocytes #     (1.0-4.8)  k/uL


 


Chloride  114 H    ()  mmol/L


 


Carbon Dioxide  18 L    (22-30)  mmol/L


 


BUN  22 H    (7-17)  mg/dL


 


Glucose  186 H    (74-99)  mg/dL


 


POC Glucose (mg/dL)   143 H  127 H  (75-99)  mg/dL


 


Calcium  8.0 L    (8.4-10.2)  mg/dL














  06/27/18 06/28/18 06/28/18 Range/Units





  20:36 06:53 06:53 


 


WBC   2.6 L   (3.8-10.6)  k/uL


 


RBC   3.07 L   (3.80-5.40)  m/uL


 


Hgb   9.4 L   (11.4-16.0)  gm/dL


 


Hct   29.0 L   (34.0-46.0)  %


 


RDW   18.5 H   (11.5-15.5)  %


 


Lymphocytes #   0.3 L   (1.0-4.8)  k/uL


 


Chloride    114 H  ()  mmol/L


 


Carbon Dioxide    17 L  (22-30)  mmol/L


 


BUN    27 H  (7-17)  mg/dL


 


Glucose    145 H  (74-99)  mg/dL


 


POC Glucose (mg/dL)  145 H    (75-99)  mg/dL


 


Calcium    7.8 L  (8.4-10.2)  mg/dL














  06/28/18 Range/Units





  07:13 


 


WBC   (3.8-10.6)  k/uL


 


RBC   (3.80-5.40)  m/uL


 


Hgb   (11.4-16.0)  gm/dL


 


Hct   (34.0-46.0)  %


 


RDW   (11.5-15.5)  %


 


Lymphocytes #   (1.0-4.8)  k/uL


 


Chloride   ()  mmol/L


 


Carbon Dioxide   (22-30)  mmol/L


 


BUN   (7-17)  mg/dL


 


Glucose   (74-99)  mg/dL


 


POC Glucose (mg/dL)  146 H  (75-99)  mg/dL


 


Calcium   (8.4-10.2)  mg/dL








 Microbiology - Last 24 Hours (Table)











 06/26/18 04:30 Stool Culture - Preliminary





 Stool 














Assessment and Plan


Plan: 


-Diarrhea: Secondary to mucositis from chemotherapy.


-Ruled out C. diff colitis


-Rectal cancer status post chemotherapy


-Type 2 diabetes mellitus


-Dehydration and acute renal failure present on admission which improved now.


-Non-anion gap metabolic acidosis secondary to hyperchloremia.  Which is again 

from IV fluids.


-Hypertension


-Hyperlipidemia

## 2018-06-28 NOTE — P.PN
Subjective


Progress Note Date: 06/28/18


Principal diagnosis: 


Rectal cancer dehydration diarrhea


Still passing nonbloody bowel movements.  Tolerating diet.  White count 2.6.  

Hemoglobin 9.4.  BUN 27.  Creatinine 0.9.  Potassium 3.6.  Sodium 140.








Objective





- Vital Signs


Vital signs: 


 Vital Signs











Temp  97.7 F   06/28/18 05:00


 


Pulse  83   06/28/18 05:00


 


Resp  16   06/28/18 05:00


 


BP  116/64   06/28/18 05:00


 


Pulse Ox  95   06/28/18 05:00








 Intake & Output











 06/27/18 06/28/18 06/28/18





 18:59 06:59 18:59


 


Intake Total 350 590 


 


Output Total 206  


 


Balance 144 590 


 


Weight 86.3 kg  


 


Intake:   


 


  Intake, IV Titration 350  





  Amount   


 


    Sodium Chloride 0.9% 1, 350  





    000 ml @ 50 mls/hr IV .   





    Q20H [facility] Rx#:[id number]   


 


  Oral  590 


 


Output:   


 


  Urine 200  


 


  Stool 6  


 


Other:   


 


  Voiding Method Toilet Diaper 





  Incontinent 


 


  # Voids 1 3 


 


  # Bowel Movements  3 














- Exam


General appearance: The patient is alert, oriented, in no acute distress.


HET: Head is normocephalic and atraumatic.  Pupils are equal and reactive.  

Oropharynx is clear without lesions.


Neck: Supple without lymphadenopathy.  Trachea midline.


Heart: S1 S2.  Regular rate and rhythm.


Lungs: No crackles or wheezes are heard.


Abdomen: Soft, nontender, nondistended with  bowel sounds.  No peritoneal 

signs.  No palpable organomegaly or masses.


Extremities: Normal skin color and turgor.  No cyanosis, rash, ulceration, 

clubbing, or edema.  Radial and pedal pulses are 2/4 bilaterally.


Neurological: No focal deficits.  Strength and sensation are grossly intact.








- Labs


CBC & Chem 7: 


 06/28/18 06:53





 06/28/18 06:53


Labs: 


 Abnormal Lab Results - Last 24 Hours (Table)











  06/27/18 06/27/18 06/27/18 Range/Units





  05:25 11:47 16:48 


 


WBC     (3.8-10.6)  k/uL


 


RBC     (3.80-5.40)  m/uL


 


Hgb     (11.4-16.0)  gm/dL


 


Hct     (34.0-46.0)  %


 


RDW     (11.5-15.5)  %


 


Lymphocytes #     (1.0-4.8)  k/uL


 


Chloride  114 H    ()  mmol/L


 


Carbon Dioxide  18 L    (22-30)  mmol/L


 


BUN  22 H    (7-17)  mg/dL


 


Glucose  186 H    (74-99)  mg/dL


 


POC Glucose (mg/dL)   143 H  127 H  (75-99)  mg/dL


 


Calcium  8.0 L    (8.4-10.2)  mg/dL














  06/27/18 06/28/18 06/28/18 Range/Units





  20:36 06:53 06:53 


 


WBC   2.6 L   (3.8-10.6)  k/uL


 


RBC   3.07 L   (3.80-5.40)  m/uL


 


Hgb   9.4 L   (11.4-16.0)  gm/dL


 


Hct   29.0 L   (34.0-46.0)  %


 


RDW   18.5 H   (11.5-15.5)  %


 


Lymphocytes #   0.3 L   (1.0-4.8)  k/uL


 


Chloride    114 H  ()  mmol/L


 


Carbon Dioxide    17 L  (22-30)  mmol/L


 


BUN    27 H  (7-17)  mg/dL


 


Glucose    145 H  (74-99)  mg/dL


 


POC Glucose (mg/dL)  145 H    (75-99)  mg/dL


 


Calcium    7.8 L  (8.4-10.2)  mg/dL














  06/28/18 06/28/18 Range/Units





  07:13 10:50 


 


WBC    (3.8-10.6)  k/uL


 


RBC    (3.80-5.40)  m/uL


 


Hgb    (11.4-16.0)  gm/dL


 


Hct    (34.0-46.0)  %


 


RDW    (11.5-15.5)  %


 


Lymphocytes #    (1.0-4.8)  k/uL


 


Chloride    ()  mmol/L


 


Carbon Dioxide    (22-30)  mmol/L


 


BUN    (7-17)  mg/dL


 


Glucose    (74-99)  mg/dL


 


POC Glucose (mg/dL)  146 H  128 H  (75-99)  mg/dL


 


Calcium    (8.4-10.2)  mg/dL








 Microbiology - Last 24 Hours (Table)











 06/26/18 04:30 Stool Culture - Preliminary





 Stool 














Assessment and Plan


(1) Rectal cancer


Current Visit: Yes   Status: Acute   Code(s): C20 - MALIGNANT NEOPLASM OF 

RECTUM   SNOMED Code(s): 422119889


   





(2) Diarrhea


Current Visit: Yes   Status: Acute   Priority: High   Code(s): R19.7 - DIARRHEA

, UNSPECIFIED   SNOMED Code(s): 09878400


   





(3) Mucositis due to radiation therapy


Current Visit: Yes   Status: Acute   Priority: High   Code(s): K12.33 - ORAL 

MUCOSITIS (ULCERATIVE) DUE TO RADIATION   SNOMED Code(s): 540336307


   


Plan: 


1.  Low residue diet.  Ensure 3 times a day with meals.  Increase Imodium 4 mg 

4 times a day scheduled.  We'll continue to follow.  Supportive measures.








Assessment and plan a care discussed with Dr. Horta

## 2018-06-29 LAB
ANION GAP SERPL CALC-SCNC: 4 MMOL/L
BUN SERPL-SCNC: 25 MG/DL (ref 7–17)
CALCIUM SPEC-MCNC: 8.2 MG/DL (ref 8.4–10.2)
CHLORIDE SERPL-SCNC: 113 MMOL/L (ref 98–107)
CO2 SERPL-SCNC: 19 MMOL/L (ref 22–30)
GLUCOSE BLD-MCNC: 131 MG/DL (ref 75–99)
GLUCOSE BLD-MCNC: 176 MG/DL (ref 75–99)
GLUCOSE BLD-MCNC: 184 MG/DL (ref 75–99)
GLUCOSE BLD-MCNC: 200 MG/DL (ref 75–99)
GLUCOSE SERPL-MCNC: 148 MG/DL (ref 74–99)
POTASSIUM SERPL-SCNC: 3.9 MMOL/L (ref 3.5–5.1)
SODIUM SERPL-SCNC: 136 MMOL/L (ref 137–145)

## 2018-06-29 RX ADMIN — CHOLESTYRAMINE SCH GM: 4 POWDER, FOR SUSPENSION ORAL at 08:52

## 2018-06-29 RX ADMIN — LOPERAMIDE HYDROCHLORIDE SCH MG: 2 CAPSULE ORAL at 13:03

## 2018-06-29 RX ADMIN — INSULIN ASPART SCH: 100 INJECTION, SOLUTION INTRAVENOUS; SUBCUTANEOUS at 08:49

## 2018-06-29 RX ADMIN — CHOLESTYRAMINE SCH GM: 4 POWDER, FOR SUSPENSION ORAL at 17:19

## 2018-06-29 RX ADMIN — Medication SCH: at 06:13

## 2018-06-29 RX ADMIN — CHOLESTYRAMINE SCH: 4 POWDER, FOR SUSPENSION ORAL at 17:22

## 2018-06-29 RX ADMIN — LISINOPRIL SCH MG: 20 TABLET ORAL at 08:51

## 2018-06-29 RX ADMIN — ACETAMINOPHEN SCH: 160 SOLUTION ORAL at 22:08

## 2018-06-29 RX ADMIN — LOPERAMIDE HYDROCHLORIDE SCH MG: 2 CAPSULE ORAL at 22:11

## 2018-06-29 RX ADMIN — ACETAMINOPHEN SCH ML: 160 SOLUTION ORAL at 08:50

## 2018-06-29 RX ADMIN — ACETAMINOPHEN SCH ML: 160 SOLUTION ORAL at 17:19

## 2018-06-29 RX ADMIN — INSULIN ASPART SCH UNIT: 100 INJECTION, SOLUTION INTRAVENOUS; SUBCUTANEOUS at 13:03

## 2018-06-29 RX ADMIN — INSULIN ASPART SCH UNIT: 100 INJECTION, SOLUTION INTRAVENOUS; SUBCUTANEOUS at 22:05

## 2018-06-29 RX ADMIN — ISOSORBIDE MONONITRATE SCH MG: 30 TABLET, EXTENDED RELEASE ORAL at 08:51

## 2018-06-29 RX ADMIN — LINAGLIPTIN SCH MG: 5 TABLET, FILM COATED ORAL at 08:51

## 2018-06-29 RX ADMIN — METOPROLOL SUCCINATE SCH MG: 25 TABLET, EXTENDED RELEASE ORAL at 08:52

## 2018-06-29 RX ADMIN — Medication SCH: at 00:08

## 2018-06-29 RX ADMIN — Medication SCH ML: at 17:19

## 2018-06-29 RX ADMIN — PANTOPRAZOLE SODIUM SCH MG: 40 TABLET, DELAYED RELEASE ORAL at 08:51

## 2018-06-29 RX ADMIN — Medication SCH ML: at 11:31

## 2018-06-29 RX ADMIN — PRAVASTATIN SODIUM SCH MG: 40 TABLET ORAL at 22:05

## 2018-06-29 RX ADMIN — ASPIRIN 81 MG CHEWABLE TABLET SCH MG: 81 TABLET CHEWABLE at 08:51

## 2018-06-29 RX ADMIN — INSULIN ASPART SCH UNIT: 100 INJECTION, SOLUTION INTRAVENOUS; SUBCUTANEOUS at 18:13

## 2018-06-29 RX ADMIN — Medication SCH ML: at 22:06

## 2018-06-29 RX ADMIN — Medication SCH: at 23:02

## 2018-06-29 RX ADMIN — LOPERAMIDE HYDROCHLORIDE SCH MG: 2 CAPSULE ORAL at 08:54

## 2018-06-29 RX ADMIN — LOPERAMIDE HYDROCHLORIDE SCH MG: 2 CAPSULE ORAL at 18:13

## 2018-06-29 NOTE — P.PN
Subjective


Progress Note Date: 06/29/18


Principal diagnosis: 


Rectal cancer dehydration diarrhea


Still passing nonbloody bowel movements despite adjustments and antidiarrheals.

  








Objective





- Vital Signs


Vital signs: 


 Vital Signs











Temp  97.8 F   06/29/18 05:23


 


Pulse  73   06/29/18 05:23


 


Resp  18   06/29/18 05:23


 


BP  150/68   06/29/18 05:23


 


Pulse Ox  98   06/29/18 05:23








 Intake & Output











 06/28/18 06/29/18 06/29/18





 18:59 06:59 18:59


 


Intake Total  910 


 


Balance  910 


 


Weight 86.3 kg 87.5 kg 


 


Intake:   


 


  Oral  910 


 


Other:   


 


  Voiding Method  Diaper Diaper





  Incontinent Incontinent


 


  # Voids 2 2 


 


  # Bowel Movements 2 1 














- Exam


General appearance: The patient is alert, oriented, in no acute distress.


HET: Head is normocephalic and atraumatic.  Pupils are equal and reactive.  

Oropharynx is clear without lesions.


Neck: Supple without lymphadenopathy.  Trachea midline.


Heart: S1 S2.  Regular rate and rhythm.


Lungs: No crackles or wheezes are heard.


Abdomen: Soft, nontender, nondistended with  bowel sounds.  No peritoneal 

signs.  No palpable organomegaly or masses.


Extremities: Normal skin color and turgor.  No cyanosis, rash, ulceration, 

clubbing, or edema.  Radial and pedal pulses are 2/4 bilaterally.


Neurological: No focal deficits.  Strength and sensation are grossly intact.








- Labs


CBC & Chem 7: 


 06/28/18 06:53





 06/29/18 06:26


Labs: 


 Abnormal Lab Results - Last 24 Hours (Table)











  06/28/18 06/28/18 06/28/18 Range/Units





  10:50 17:38 20:04 


 


Sodium     (137-145)  mmol/L


 


Chloride     ()  mmol/L


 


Carbon Dioxide     (22-30)  mmol/L


 


BUN     (7-17)  mg/dL


 


Glucose     (74-99)  mg/dL


 


POC Glucose (mg/dL)  128 H  195 H  193 H  (75-99)  mg/dL


 


Calcium     (8.4-10.2)  mg/dL














  06/29/18 06/29/18 Range/Units





  06:26 07:38 


 


Sodium  136 L   (137-145)  mmol/L


 


Chloride  113 H   ()  mmol/L


 


Carbon Dioxide  19 L   (22-30)  mmol/L


 


BUN  25 H   (7-17)  mg/dL


 


Glucose  148 H   (74-99)  mg/dL


 


POC Glucose (mg/dL)   131 H  (75-99)  mg/dL


 


Calcium  8.2 L   (8.4-10.2)  mg/dL








 Microbiology - Last 24 Hours (Table)











 06/26/18 04:30 Stool Culture - Preliminary





 Stool 














Assessment and Plan


(1) Rectal cancer


Current Visit: Yes   Status: Acute   Code(s): C20 - MALIGNANT NEOPLASM OF 

RECTUM   SNOMED Code(s): 862871139


   





(2) Diarrhea


Current Visit: Yes   Status: Acute   Priority: High   Code(s): R19.7 - DIARRHEA

, UNSPECIFIED   SNOMED Code(s): 93863747


   





(3) Mucositis due to radiation therapy


Current Visit: Yes   Status: Acute   Priority: High   Code(s): K12.33 - ORAL 

MUCOSITIS (ULCERATIVE) DUE TO RADIATION   SNOMED Code(s): 773181934


   


Plan: 


1.  Low residue diet.  Ensure 3 times a day with meals.  Continue with Imodium 

4 mg 4 times a day scheduled.  Dr. Horta recommends abdominal x-rays to rule 

out fecal bolus contributing to diarrhea.  Additional stool studies requested.  

We'll continue to follow.  Supportive measures.








Assessment and plan a care discussed with Dr. Horta

## 2018-06-29 NOTE — P.PN
Subjective





Patient was admitted secondary to diarrhea related to chemotherapy and 

mucositis.  Patient's creatinine is around 0.9 today still had 3 episodes of 

diarrhea today.  Patient was ruled out Clostridium difficile colitis.  Patient 

will need to be discharged to subacute rehabilitation.  Because of her diarrhea 

will hold her discharged today which appears to be improving.  Patient the was 

on nitrofurantoin for possible urinary tract infection in the past patient 

received an of days of antibiotic this will be discontinued which may help her 

diarrhea.





06/29/2018


Patient is still having quite a few episodes of diarrhea today.  

Gastroenterology is following the patient I'll order repeat C. diff testing.  

C. diff was negative on admission.  Patient is on Imodium and Questran.  

Because of hyperchloremia and switch her IV fluids from normal saline to 

lactated Ringer's.  Pending abdominal x-rays.  CAT scan from 27th was 

essentially within normal limits





Constitutional: Denied any fatigue denied any fever.


Cardio vascular: denied any chest pain, palpitations


Gastrointestinal denied any nausea vomiting


Pulmonary: Denied any shortness of breath cough


Neurologic denied any new focal deficits





Objective





- Vital Signs


Vital signs: 


 Vital Signs











Temp  97.8 F   06/29/18 05:23


 


Pulse  73   06/29/18 05:23


 


Resp  18   06/29/18 05:23


 


BP  150/68   06/29/18 05:23


 


Pulse Ox  98   06/29/18 05:23








 Intake & Output











 06/28/18 06/29/18 06/29/18





 18:59 06:59 18:59


 


Intake Total  910 


 


Balance  910 


 


Weight 86.3 kg 87.5 kg 


 


Intake:   


 


  Oral  910 


 


Other:   


 


  Voiding Method  Diaper Diaper





  Incontinent Incontinent


 


  # Voids 2 2 


 


  # Bowel Movements 2 1 














- Exam


PHYSICAL EXAMINATION: 





GENERAL: The patient is alert and oriented x3, not in any acute distress.  Thin 

built appears to have malaise


HEENT: Pupils are round and equally reacting to light. EOMI. No scleral 

icterus. No conjunctival pallor. Normocephalic, atraumatic. No pharyngeal 

erythema. No thyromegaly. 


CARDIOVASCULAR: S1 and S2 present. No murmurs, rubs, or gallops. 


PULMONARY: Chest is clear to auscultation, no wheezing or crackles. 


ABDOMEN: Soft, nontender, nondistended, normoactive bowel sounds. No palpable 

organomegaly. 


MUSCULOSKELETAL: No joint swelling or deformity.


EXTREMITIES: No cyanosis, clubbing, or pedal edema. 


NEUROLOGICAL: Gross neurological examination did not reveal any focal deficits. 


SKIN: No rashes. 











- Labs


CBC & Chem 7: 


 06/28/18 06:53





 06/29/18 06:26


Labs: 


 Abnormal Lab Results - Last 24 Hours (Table)











  06/28/18 06/28/18 06/28/18 Range/Units





  10:50 17:38 20:04 


 


Sodium     (137-145)  mmol/L


 


Chloride     ()  mmol/L


 


Carbon Dioxide     (22-30)  mmol/L


 


BUN     (7-17)  mg/dL


 


Glucose     (74-99)  mg/dL


 


POC Glucose (mg/dL)  128 H  195 H  193 H  (75-99)  mg/dL


 


Calcium     (8.4-10.2)  mg/dL














  06/29/18 06/29/18 Range/Units





  06:26 07:38 


 


Sodium  136 L   (137-145)  mmol/L


 


Chloride  113 H   ()  mmol/L


 


Carbon Dioxide  19 L   (22-30)  mmol/L


 


BUN  25 H   (7-17)  mg/dL


 


Glucose  148 H   (74-99)  mg/dL


 


POC Glucose (mg/dL)   131 H  (75-99)  mg/dL


 


Calcium  8.2 L   (8.4-10.2)  mg/dL








 Microbiology - Last 24 Hours (Table)











 06/26/18 04:30 Stool Culture - Preliminary





 Stool 














Assessment and Plan


Plan: 


-Diarrhea: Secondary to mucositis from chemotherapy.  Patient is on Imodium and 

Questran repeat abdominal x-rays is being obtained


-Ruled out C. diff colitis, still remains concern of C. diff because of which I 

am ordering repeat C. diff testing


-Rectal cancer status post chemotherapy


-Type 2 diabetes mellitus


-Dehydration and acute renal failure present on admission which improved now.


-Non-anion gap metabolic acidosis secondary to hyperchloremia.  Which is again 

from IV fluids.  IV fluids will be switched to lactated Ringer's


-Hypertension


-Hyperlipidemia

## 2018-06-29 NOTE — XR
EXAMINATION TYPE: XR abdomen complete w decub

 

DATE OF EXAM: 6/29/2018

 

CLINICAL HISTORY: Diarrhea rule out fecal retention per order.

 

TECHNIQUE: Supine, upright, and left side down lateral decubitus views of abdomen are obtained.

 

COMPARISON:  CT abdomen and pelvis from 2 days ago.

 

FINDINGS:  Scattered gas is seen in non-distended small bowel loops.  Gas and contrast material is se
en in non-distended colon. No significant fecal retention. Scattered air-fluid levels are seen in sli
ghtly prominent small and large bowel loops throughout the central abdomen, nonspecific finding. Chol
ecystectomy clips are noted. Overlying vascular calcification is seen. There is levoconvex scoliosis 
with multilevel spurring. There is moderate axial joint space loss in both hips. Lung bases are clear
.

 

IMPRESSION:  Overall nonspecific but felt to be nonobstructive bowel gas pattern. No significant colo
luisa fecal retention is present. Mild residual contrast distal colon from recent CT is seen.

## 2018-06-30 LAB
ALBUMIN SERPL-MCNC: 2.1 G/DL (ref 3.5–5)
ALP SERPL-CCNC: 44 U/L (ref 38–126)
ALT SERPL-CCNC: 22 U/L (ref 9–52)
ANION GAP SERPL CALC-SCNC: 10 MMOL/L
AST SERPL-CCNC: 10 U/L (ref 14–36)
BUN SERPL-SCNC: 26 MG/DL (ref 7–17)
CALCIUM SPEC-MCNC: 8.1 MG/DL (ref 8.4–10.2)
CELLS COUNTED: 200
CHLORIDE SERPL-SCNC: 111 MMOL/L (ref 98–107)
CO2 SERPL-SCNC: 16 MMOL/L (ref 22–30)
EOSINOPHIL # BLD MANUAL: 0.03 K/UL (ref 0–0.7)
ERYTHROCYTE [DISTWIDTH] IN BLOOD BY AUTOMATED COUNT: 3.33 M/UL (ref 3.8–5.4)
ERYTHROCYTE [DISTWIDTH] IN BLOOD: 18.6 % (ref 11.5–15.5)
GLUCOSE BLD-MCNC: 145 MG/DL (ref 75–99)
GLUCOSE BLD-MCNC: 174 MG/DL (ref 75–99)
GLUCOSE BLD-MCNC: 176 MG/DL (ref 75–99)
GLUCOSE BLD-MCNC: 182 MG/DL (ref 75–99)
GLUCOSE SERPL-MCNC: 153 MG/DL (ref 74–99)
HCT VFR BLD AUTO: 31.1 % (ref 34–46)
HGB BLD-MCNC: 9.8 GM/DL (ref 11.4–16)
LYMPHOCYTES # BLD MANUAL: 0.25 K/UL (ref 1–4.8)
MAGNESIUM SPEC-SCNC: 1.8 MG/DL (ref 1.6–2.3)
MCH RBC QN AUTO: 29.5 PG (ref 25–35)
MCHC RBC AUTO-ENTMCNC: 31.6 G/DL (ref 31–37)
MCV RBC AUTO: 93.4 FL (ref 80–100)
METAMYELOCYTES # BLD: 0.03 K/UL
MONOCYTES # BLD MANUAL: 0.5 K/UL (ref 0–1)
NEUTROPHILS NFR BLD MANUAL: 59 %
NEUTS SEG # BLD MANUAL: 2 K/UL (ref 1.3–7.7)
PH UR: 5.5 [PH] (ref 5–8)
PLATELET # BLD AUTO: 206 K/UL (ref 150–450)
POTASSIUM SERPL-SCNC: 3.8 MMOL/L (ref 3.5–5.1)
PROT SERPL-MCNC: 3.8 G/DL (ref 6.3–8.2)
PROT UR QL: (no result)
RBC UR QL: 2 /HPF (ref 0–5)
SODIUM SERPL-SCNC: 137 MMOL/L (ref 137–145)
SP GR UR: 1.01 (ref 1–1.03)
SQUAMOUS UR QL AUTO: 4 /HPF (ref 0–4)
UROBILINOGEN UR QL STRIP: <2 MG/DL (ref ?–2)
WBC # BLD AUTO: 2.8 K/UL (ref 3.8–10.6)
WBC #/AREA URNS HPF: 21 /HPF (ref 0–5)

## 2018-06-30 RX ADMIN — DEXAMETHASONE SODIUM PHOSPHATE SCH: 4 INJECTION, SOLUTION INTRA-ARTICULAR; INTRALESIONAL; INTRAMUSCULAR; INTRAVENOUS; SOFT TISSUE at 23:49

## 2018-06-30 RX ADMIN — Medication SCH ML: at 05:41

## 2018-06-30 RX ADMIN — PSYLLIUM HUSK SCH: 3.4 POWDER ORAL at 18:45

## 2018-06-30 RX ADMIN — CHOLESTYRAMINE SCH GM: 4 POWDER, FOR SUSPENSION ORAL at 17:25

## 2018-06-30 RX ADMIN — LOPERAMIDE HYDROCHLORIDE SCH MG: 2 CAPSULE ORAL at 08:13

## 2018-06-30 RX ADMIN — NYSTATIN SCH UNIT: 100000 SUSPENSION ORAL at 17:26

## 2018-06-30 RX ADMIN — PSYLLIUM HUSK SCH GM: 3.4 POWDER ORAL at 20:29

## 2018-06-30 RX ADMIN — INSULIN ASPART SCH: 100 INJECTION, SOLUTION INTRAVENOUS; SUBCUTANEOUS at 08:17

## 2018-06-30 RX ADMIN — ACETAMINOPHEN SCH ML: 160 SOLUTION ORAL at 20:19

## 2018-06-30 RX ADMIN — CHOLESTYRAMINE SCH GM: 4 POWDER, FOR SUSPENSION ORAL at 13:17

## 2018-06-30 RX ADMIN — ASPIRIN 81 MG CHEWABLE TABLET SCH MG: 81 TABLET CHEWABLE at 08:14

## 2018-06-30 RX ADMIN — LISINOPRIL SCH MG: 20 TABLET ORAL at 08:13

## 2018-06-30 RX ADMIN — DIPHENOXYLATE HYDROCHLORIDE AND ATROPINE SULFATE SCH: 2.5; .025 TABLET ORAL at 16:15

## 2018-06-30 RX ADMIN — ISOSORBIDE MONONITRATE SCH MG: 30 TABLET, EXTENDED RELEASE ORAL at 08:13

## 2018-06-30 RX ADMIN — Medication SCH ML: at 20:17

## 2018-06-30 RX ADMIN — NYSTATIN SCH UNIT: 100000 SUSPENSION ORAL at 21:38

## 2018-06-30 RX ADMIN — LINAGLIPTIN SCH MG: 5 TABLET, FILM COATED ORAL at 08:13

## 2018-06-30 RX ADMIN — DIPHENOXYLATE HYDROCHLORIDE AND ATROPINE SULFATE SCH EACH: 2.5; .025 TABLET ORAL at 21:38

## 2018-06-30 RX ADMIN — CHOLESTYRAMINE SCH: 4 POWDER, FOR SUSPENSION ORAL at 16:15

## 2018-06-30 RX ADMIN — Medication SCH ML: at 13:17

## 2018-06-30 RX ADMIN — POTASSIUM CHLORIDE SCH MLS/HR: 14.9 INJECTION, SOLUTION INTRAVENOUS at 20:16

## 2018-06-30 RX ADMIN — ACETAMINOPHEN SCH ML: 160 SOLUTION ORAL at 08:15

## 2018-06-30 RX ADMIN — Medication SCH ML: at 17:26

## 2018-06-30 RX ADMIN — METOPROLOL SUCCINATE SCH MG: 25 TABLET, EXTENDED RELEASE ORAL at 08:12

## 2018-06-30 RX ADMIN — INSULIN ASPART SCH UNIT: 100 INJECTION, SOLUTION INTRAVENOUS; SUBCUTANEOUS at 13:17

## 2018-06-30 RX ADMIN — DIPHENOXYLATE HYDROCHLORIDE AND ATROPINE SULFATE SCH EACH: 2.5; .025 TABLET ORAL at 17:26

## 2018-06-30 RX ADMIN — INSULIN ASPART SCH UNIT: 100 INJECTION, SOLUTION INTRAVENOUS; SUBCUTANEOUS at 21:38

## 2018-06-30 RX ADMIN — Medication SCH: at 23:48

## 2018-06-30 RX ADMIN — DEXAMETHASONE SODIUM PHOSPHATE SCH MG: 4 INJECTION, SOLUTION INTRA-ARTICULAR; INTRALESIONAL; INTRAMUSCULAR; INTRAVENOUS; SOFT TISSUE at 17:32

## 2018-06-30 RX ADMIN — INSULIN ASPART SCH UNIT: 100 INJECTION, SOLUTION INTRAVENOUS; SUBCUTANEOUS at 17:37

## 2018-06-30 RX ADMIN — PANTOPRAZOLE SODIUM SCH MG: 40 TABLET, DELAYED RELEASE ORAL at 08:13

## 2018-06-30 RX ADMIN — ACETAMINOPHEN SCH: 160 SOLUTION ORAL at 17:19

## 2018-06-30 RX ADMIN — PRAVASTATIN SODIUM SCH MG: 40 TABLET ORAL at 20:18

## 2018-06-30 NOTE — PN
PROGRESS NOTE



DATE OF SERVICE:

06/30/2018



PRESENTING COMPLAINT:

Weak and tired.



INTERVAL HISTORY:

This is a patient with rectal cancer, status post chemoradiation, presents with

mucositis, not able to tolerate much by mouth, and severe diarrhea.  Infectious causes

were ruled out, including C. diff.  The patient has been on antidiarrheal.  The patient

is also bothered by ulcers in the mouth.   is at the bedside.  Does feel weak

and tired.



REVIEW OF SYSTEMS:

Done for constitutional, cardiovascular, GI, pulmonary; relevant findings as above.



CURRENT MEDICATIONS:

Reviewed that include:

1. Questran.

2. Lomotil.

3. Lomotil.

4. Prednisone.



EXAMINATION:

Temperature 99.5, pulse 74, respirations 16, blood pressure 104/65, pulse ox 94% on

room air.

GENERAL APPEARANCE:  Lying in bed, tired-appearing.

EYES:  Pupils equal.  Conjunctiva pale.

HEENT:  External nose and ears normal.  Oral cavity:  Dry mucous membranes and pulses

noted with some crusting in the roof of the mouth.

NECK:  JVD not raised.  Mass not palpable.

RESPIRATORY:  Effort normal.  Lungs are clear.

CARDIOVASCULAR:  First and second sounds normal. No edema.

ABDOMEN:  Mild tenderness.  No guarding or rigidity.  Liver and spleen not palpable.

PSYCHIATRY:  Alert and oriented x3.  Mood and affect low-appearing.



INVESTIGATIONS:

White count 2.8, hemoglobin 9.8, platelets 206.  Potassium 3.8, bicarb 16, BUN 26,

creatinine 0.88.  Albumin 2.1.



ASSESSMENT:

1. Acute severe diarrhea from chemo radiation mucositis.

2. Acute mucositis of the oral cavity.

3. Bicytopenia as a side effect of chemotherapy.

4. 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

5. Metabolic acidosis.

6. Diabetes mellitus type 2 on oral hypoglycemic.

7. Essential hypertension.

8. Obesity; BMI 32.1.

9. Hypoalbuminemia, probably an acute phase reactant.



PLAN:

I had a lengthy talk with the patient and  at the bedside.  Will add some

Metamucil to see if that makes the stool a bit stronger and more formed.  The patient

also has some nausea.  Any IV pain medication will probably make the nausea worse at

this point.  Also going to have the patient take some milkshakes, etc. at room

temperature and encouraged the patient to sit up on the bed.  Will also try oral bicarb

and also add bicarbonate to the IV fluids.





MMODL / IJN: [id number] / Job#: [id number]

## 2018-06-30 NOTE — P.PN
Subjective


Progress Note Date: 06/30/18


Principal diagnosis: 





Rectal Cancer with Diarrhea and Anemia





6/30/18 - Patient seen in follow-up today. She is still very nauseated, unable 

to eat as her mouth is sore and decreased appetite, and continued diarrhea. 

 at bedside and concerned about her nutritional status. 





Objective





- Vital Signs


Vital signs: 


 Vital Signs











Temp  98.3 F   06/30/18 06:31


 


Pulse  71   06/30/18 06:31


 


Resp  16   06/30/18 06:31


 


BP  157/68   06/30/18 06:31


 


Pulse Ox  96   06/30/18 06:31








 Intake & Output











 06/29/18 06/30/18 06/30/18





 18:59 06:59 18:59


 


Intake Total  790 


 


Output Total 0  


 


Balance 0 790 


 


Intake:   


 


  Oral  790 


 


Output:   


 


  Urine 0  


 


    Uretheral (Zuniga) 0  


 


Other:   


 


  Voiding Method Diaper Bedside Commode 





 Incontinent Diaper 





  Incontinent 


 


  # Voids  2 


 


  # Bowel Movements 2 2 














- Constitutional


General appearance: Present: cooperative, no acute distress





- EENT


EENT Comment(s): 





Erythema and areas of evident mucocytis, mild thrush, and evidence of healing 

ulcers. Erythema at posterior pharynx with small posterior lesion. 


Eyes: Present: PERRLA, dentition normal


ENT: Present: hard of hearing, NA/AT, other





- Neck


Details: 





Supple, Trachea midline


Neck: Present: normal ROM





- Respiratory


Respiratory: bilateral: diminished (Bilbasilar, no increased effort noted)





- Cardiovascular


Rhythm: regular


Heart sounds: normal: S1, S2





- Gastrointestinal


General gastrointestinal: Present: normal bowel sounds, soft, tenderness





- Integumentary


Integumentary: Present: pale





- Neurologic


Neurologic Comment(s): 





No focal Defects noted on Exam


Neurologic: Present: CNII-XII intact





- Musculoskeletal


Musculoskeletal: Present: generalized weakness, strength equal bilaterally





- Psychiatric


Psychiatric: Present: A&O x's 3, appropriate affect, intact judgment & insight





- Labs


CBC & Chem 7: 


 06/30/18 10:57





 06/30/18 10:57


Labs: 


 Abnormal Lab Results - Last 24 Hours (Table)











  06/29/18 06/29/18 06/29/18 Range/Units





  12:56 17:09 20:12 


 


POC Glucose (mg/dL)  176 H  200 H  184 H  (75-99)  mg/dL


 


Urine Appearance     (Clear)  


 


Urine Protein     (Negative)  


 


Urine Ketones     (Negative)  


 


Urine Blood     (Negative)  


 


Ur Leukocyte Esterase     (Negative)  


 


Urine WBC     (0-5)  /hpf


 


Urine Bacteria     (None)  /hpf


 


Urine Mucus     (None)  /hpf














  06/30/18 06/30/18 Range/Units





  02:30 07:21 


 


POC Glucose (mg/dL)   145 H  (75-99)  mg/dL


 


Urine Appearance  Cloudy H   (Clear)  


 


Urine Protein  1+ H   (Negative)  


 


Urine Ketones  Trace H   (Negative)  


 


Urine Blood  Trace H   (Negative)  


 


Ur Leukocyte Esterase  Small H   (Negative)  


 


Urine WBC  21 H   (0-5)  /hpf


 


Urine Bacteria  Many H   (None)  /hpf


 


Urine Mucus  Many H   (None)  /hpf








 Microbiology - Last 24 Hours (Table)











 06/26/18 04:30 Stool Culture - Final





 Stool 














Assessment and Plan


Plan: 








Assessment and Plan


(1) Palmar plantar erythrodysaesthesia due to cytotoxic therapy


Narrative/Plan: 


 - Hydrocortisone cream and aquaphor to bedside


 - Continue Supportive Care


Current Visit: Yes   Status: Acute   Priority: High   Code(s): L27.1 - LOC SKIN 

ERUPTION DUE TO DRUGS AND MEDS TAKEN INTERNALLY; T45.1X5A - ADVERSE EFFECT OF 

ANTINEOPLASTIC AND IMMUNOSUP DRUGS, INIT   SNOMED Code(s): 498621439


   





(2) Diarrhea


Narrative/Plan: 


 - Not improving, Stool studies negative at this point, await pending studies


 - Lomotil, Imodium, and Questran


 - Monitor CMP, Mag


 - Restart LR as soon as IV Line replaced , appears clinically Dehydrated


 - Potentially increased from Radiation Therapy as well as Xeloda therapy, 

Continue to hold at this time.





Current Visit: Yes   Status: Acute   Priority: High   Code(s): R19.7 - DIARRHEA

, UNSPECIFIED   SNOMED Code(s): 91410890


   





(3) Mucositis due to radiation therapy


Narrative/Plan: 


 - Continue supportive medications, diligent mouth care, softer foods to reduce 

irritation. 


 - I will add decadron based mouth Rinse and nystatin - May combine with Kools 


Current Visit: Yes   Status: Acute   Priority: High   Code(s): K12.33 - ORAL 

MUCOSITIS (ULCERATIVE) DUE TO RADIATION   SNOMED Code(s): 517670768


   





(4) Pancytopenia due to antineoplastic chemotherapy


Narrative/Plan: 


 - Continue to Monitor CBC


 - Recently given supportive Parental Iron in office with IV Faraheme on 5/30/ 18 and 6/6/18


 - Transfusion support platlets less than 10, PRBC hgb less than 7


 - Monitor for s/s infection with leukopenia secondary antineoplastic therapy





(5) Rectal Adenocarcinoma - Undergoing current treatment with Xeolda and 

concurrent radiation therapy


 - Continue to Hold Xeloda and Radiation Therapy





(6) Persistent Nausea - 


 - Continue antiemetics and supportive care


 - 


(7) Protein Calorie Malnutrition - 


 - Albumin has decreased to 2.1 from 3.4 since admission, will need to increase 

supportive measures and nutritional status. 


 - May need to consider short term alternative form of Nutrition. 


 - Monitor CMP daily


 - Patient has no IV access at this time, obtain Line access with PIV or Picc 

line, re-initiate IVF Hydration 





(8) UTI


 - Urinalysis Positive - Send for Culture


 - Allergy to Cipro


 - Currently no IV Access, unable to swallow pills without vomiting or 

increased nausea, spoke to Dr. Mclean and will monitor symptoms of UTI and 

await culture while monitoring WBC, concern Abx will worsen Diarrhea, she 

remains asymptomatic in relation to UTI

## 2018-06-30 NOTE — PN
PROGRESS NOTE



DATE OF SERVICE:

06/30/2018



REQUESTING PHYSICIAN:

Dr. Balderrama



Patient is a 78-year-old pleasant white female recently diagnosed with rectal cancer

for which she is undergoing chemoradiation therapy.  She was admitted to the hospital

because of severe diarrhea, probably related to chemotherapy.  Stool cultures were

negative.  Stool for C difficile was negative.  Currently on Imodium 1 tablet every 4

hours as well as Questran 4 grams twice daily.  She is still having diarrhea.  This

morning she had 2 bowel movements, through the night she did not have any bowel

movements.  She feels a little bit better.  She does complain of some cramping lower

abdominal pain.  Complains of some oral sores. No fever, chills, or night sweats.



PHYSICAL EXAMINATION:

On physical examination, she appears comfortable, no apparent distress. Vital signs are

stable. Blood pressure 141/57, pulse rate 73, temperature 97.8.

HEENT examination unremarkable.

HEENT examination is unremarkable. Conjunctivae pink. Sclerae anicteric. Oral cavity,

no lesions.

NECK:  No JVD or lymph node enlargement.

Chest was clear to auscultation.

HEART:  Regular rate and rhythm.

Abdomen is slightly distended.  There was some tenderness in the left lower quadrant

area, but overall it was benign.  Bowel sounds are positive.  No organomegaly.

EXTREMITIES: No pedal edema

SKIN: No rashes.

NEURO:  Alert and oriented x3.  No focal deficits.



LABS:

No labs are done from today.  Basic metabolic panel from yesterday was normal.

Abdominal x-ray done yesterday was unremarkable.



IMPRESSION:

This is a lady with recently diagnosed with rectal cancer diagnosed in April of 2018,

presently undergoing chemo/radiation therapy, but for the last 3 weeks, she has been

having intermittent diarrhea that has been progressively getting worse.  She is having

bowel movements anywhere from 8 to 10 a day, which are loose to watery in consistency.

Present stool studies, stool cultures and C difficile toxin were negative.  Presently

on empiric therapy, she is on antimotility agents with low Imodium as well as Questran

and she feels a little bit better today compared to yesterday.  She had only 2 bowel

movements since this morning, somewhat loose in consistency.



RECOMMENDATIONS:

1. Change Imodium to Lomotil 1 tablet 4 times daily.

2. Continue with the Questran.

3. Encouraged oral intake.

We will follow the patient closely during her hospital stay.



Thank you for this consultation.





ZAHIRA / NISHI: [id number] / Job#: [id number]

## 2018-07-01 LAB
ANION GAP SERPL CALC-SCNC: 11 MMOL/L
BUN SERPL-SCNC: 38 MG/DL (ref 7–17)
CALCIUM SPEC-MCNC: 7.9 MG/DL (ref 8.4–10.2)
CHLORIDE SERPL-SCNC: 110 MMOL/L (ref 98–107)
CO2 SERPL-SCNC: 18 MMOL/L (ref 22–30)
GLUCOSE BLD-MCNC: 148 MG/DL (ref 75–99)
GLUCOSE BLD-MCNC: 161 MG/DL (ref 75–99)
GLUCOSE BLD-MCNC: 161 MG/DL (ref 75–99)
GLUCOSE BLD-MCNC: 170 MG/DL (ref 75–99)
GLUCOSE SERPL-MCNC: 149 MG/DL (ref 74–99)
POTASSIUM SERPL-SCNC: 3.9 MMOL/L (ref 3.5–5.1)
SODIUM SERPL-SCNC: 139 MMOL/L (ref 137–145)

## 2018-07-01 RX ADMIN — INSULIN ASPART SCH UNIT: 100 INJECTION, SOLUTION INTRAVENOUS; SUBCUTANEOUS at 17:37

## 2018-07-01 RX ADMIN — DIPHENOXYLATE HYDROCHLORIDE AND ATROPINE SULFATE SCH EACH: 2.5; .025 TABLET ORAL at 08:18

## 2018-07-01 RX ADMIN — INSULIN ASPART SCH UNIT: 100 INJECTION, SOLUTION INTRAVENOUS; SUBCUTANEOUS at 20:28

## 2018-07-01 RX ADMIN — PANTOPRAZOLE SODIUM SCH MG: 40 TABLET, DELAYED RELEASE ORAL at 08:19

## 2018-07-01 RX ADMIN — DIPHENOXYLATE HYDROCHLORIDE AND ATROPINE SULFATE SCH EACH: 2.5; .025 TABLET ORAL at 17:19

## 2018-07-01 RX ADMIN — ASPIRIN 81 MG CHEWABLE TABLET SCH MG: 81 TABLET CHEWABLE at 08:19

## 2018-07-01 RX ADMIN — METOPROLOL SUCCINATE SCH MG: 25 TABLET, EXTENDED RELEASE ORAL at 08:18

## 2018-07-01 RX ADMIN — Medication SCH ML: at 15:20

## 2018-07-01 RX ADMIN — INSULIN ASPART SCH UNIT: 100 INJECTION, SOLUTION INTRAVENOUS; SUBCUTANEOUS at 09:38

## 2018-07-01 RX ADMIN — ACETAMINOPHEN SCH ML: 160 SOLUTION ORAL at 08:21

## 2018-07-01 RX ADMIN — DIPHENOXYLATE HYDROCHLORIDE AND ATROPINE SULFATE SCH EACH: 2.5; .025 TABLET ORAL at 11:49

## 2018-07-01 RX ADMIN — Medication SCH: at 08:16

## 2018-07-01 RX ADMIN — NYSTATIN SCH UNIT: 100000 SUSPENSION ORAL at 17:20

## 2018-07-01 RX ADMIN — PSYLLIUM HUSK SCH GM: 3.4 POWDER ORAL at 08:18

## 2018-07-01 RX ADMIN — ISOSORBIDE MONONITRATE SCH MG: 30 TABLET, EXTENDED RELEASE ORAL at 08:19

## 2018-07-01 RX ADMIN — POTASSIUM CHLORIDE SCH MLS/HR: 14.9 INJECTION, SOLUTION INTRAVENOUS at 15:20

## 2018-07-01 RX ADMIN — CHOLESTYRAMINE SCH GM: 4 POWDER, FOR SUSPENSION ORAL at 09:39

## 2018-07-01 RX ADMIN — DEXAMETHASONE SODIUM PHOSPHATE SCH MG: 4 INJECTION, SOLUTION INTRA-ARTICULAR; INTRALESIONAL; INTRAMUSCULAR; INTRAVENOUS; SOFT TISSUE at 11:51

## 2018-07-01 RX ADMIN — DEXAMETHASONE SODIUM PHOSPHATE SCH MG: 4 INJECTION, SOLUTION INTRA-ARTICULAR; INTRALESIONAL; INTRAMUSCULAR; INTRAVENOUS; SOFT TISSUE at 17:20

## 2018-07-01 RX ADMIN — ACETAMINOPHEN SCH: 160 SOLUTION ORAL at 11:24

## 2018-07-01 RX ADMIN — DIPHENOXYLATE HYDROCHLORIDE AND ATROPINE SULFATE SCH EACH: 2.5; .025 TABLET ORAL at 22:46

## 2018-07-01 RX ADMIN — INSULIN ASPART SCH UNIT: 100 INJECTION, SOLUTION INTRAVENOUS; SUBCUTANEOUS at 11:50

## 2018-07-01 RX ADMIN — LISINOPRIL SCH MG: 20 TABLET ORAL at 08:19

## 2018-07-01 RX ADMIN — CHOLESTYRAMINE SCH GM: 4 POWDER, FOR SUSPENSION ORAL at 15:20

## 2018-07-01 RX ADMIN — PRAVASTATIN SODIUM SCH MG: 40 TABLET ORAL at 20:28

## 2018-07-01 RX ADMIN — ONDANSETRON PRN MG: 2 INJECTION INTRAMUSCULAR; INTRAVENOUS at 08:31

## 2018-07-01 RX ADMIN — LINAGLIPTIN SCH MG: 5 TABLET, FILM COATED ORAL at 08:20

## 2018-07-01 RX ADMIN — Medication SCH ML: at 20:28

## 2018-07-01 RX ADMIN — NYSTATIN SCH UNIT: 100000 SUSPENSION ORAL at 22:46

## 2018-07-01 RX ADMIN — PSYLLIUM HUSK SCH GM: 3.4 POWDER ORAL at 20:33

## 2018-07-01 RX ADMIN — Medication SCH ML: at 11:51

## 2018-07-01 RX ADMIN — ACETAMINOPHEN SCH ML: 160 SOLUTION ORAL at 22:45

## 2018-07-01 RX ADMIN — DEXAMETHASONE SODIUM PHOSPHATE SCH MG: 4 INJECTION, SOLUTION INTRA-ARTICULAR; INTRALESIONAL; INTRAMUSCULAR; INTRAVENOUS; SOFT TISSUE at 08:16

## 2018-07-01 RX ADMIN — CHOLESTYRAMINE SCH GM: 4 POWDER, FOR SUSPENSION ORAL at 17:20

## 2018-07-01 RX ADMIN — POTASSIUM CHLORIDE SCH MLS/HR: 14.9 INJECTION, SOLUTION INTRAVENOUS at 04:28

## 2018-07-01 RX ADMIN — NYSTATIN SCH UNIT: 100000 SUSPENSION ORAL at 11:52

## 2018-07-01 RX ADMIN — NYSTATIN SCH UNIT: 100000 SUSPENSION ORAL at 08:18

## 2018-07-01 NOTE — P.PN
Subjective


Progress Note Date: 07/01/18


Principal diagnosis: 





Rectal Cancer with Diarrhea and Anemia





6/30/18 - Patient seen in follow-up today. She is still very nauseated, unable 

to eat as her mouth is sore and decreased appetite, and continued diarrhea. 

 at bedside and concerned about her nutritional status. 





7/1/18 - Patient seen in follow-up today. She is tolerating protein shakes well

, Her mouth is mildly improving and they obtained IV access so she is receiving 

IV Hydration 





Objective





- Vital Signs


Vital signs: 


 Vital Signs











Temp  97 F L  07/01/18 07:00


 


Pulse  79   07/01/18 08:40


 


Resp  16   07/01/18 08:40


 


BP  121/50   07/01/18 07:00


 


Pulse Ox  93 L  07/01/18 07:00








 Intake & Output











 06/30/18 07/01/18 07/01/18





 18:59 06:59 18:59


 


Intake Total 120 740 


 


Output Total 4 90 2


 


Balance 116 650 -2


 


Intake:   


 


  Intake, IV Titration  500 





  Amount   


 


    Lactated Ringers 1,000 ml  500 





    @ 125 mls/hr IV .[id number]   





    PRETTY with Sodium Bicarb (1   





    Meq/ml) 50 ml Rx#:   





    [id number]   


 


  Oral 120 240 


 


Output:   


 


  Urine  90 


 


    Straight  90 


 


  Stool 4  2


 


Other:   


 


  Voiding Method Bedside Commode Bedside Commode Bedside Commode





 Diaper Diaper Diaper





 Incontinent Incontinent Incontinent


 


  # Voids 2 1 


 


  # Bowel Movements 0 3 














- Constitutional


General appearance: Present: cooperative, no acute distress





- EENT


Eyes: Present: EOMI, PERRLA, normal appearance


ENT: Present: hard of hearing, NA/AT, other, pharyngeal erythema





- Neck


Details: 





Supple, Trachea Midline


Neck: Present: normal ROM





- Respiratory


Respiratory: bilateral: CTA (No increased Effort)





- Cardiovascular


Rhythm: regular


Heart sounds: normal: S1, S2





- Gastrointestinal


General gastrointestinal: Present: soft, tenderness





- Neurologic


Neurologic Comment(s): 





No focal Defects


Neurologic: Present: CNII-XII intact





- Musculoskeletal


Musculoskeletal: Present: generalized weakness, strength equal bilaterally





- Psychiatric


Psychiatric: Present: A&O x's 3, appropriate affect, intact judgment & insight





- Labs


CBC & Chem 7: 


 06/30/18 10:57





 07/01/18 07:42


Labs: 


 Abnormal Lab Results - Last 24 Hours (Table)











  06/30/18 06/30/18 06/30/18 Range/Units





  10:57 12:00 17:20 


 


Lymphocytes # (Manual)  0.25 L    (1.0-4.8)  k/uL


 


Metamyelocytes # (Man)  0.03 H    (0)  k/uL


 


Retic Count  2.7 H    (0.5-2.0)  %


 


Chloride     ()  mmol/L


 


Carbon Dioxide     (22-30)  mmol/L


 


BUN     (7-17)  mg/dL


 


Glucose     (74-99)  mg/dL


 


POC Glucose (mg/dL)   182 H  174 H  (75-99)  mg/dL


 


Calcium     (8.4-10.2)  mg/dL














  06/30/18 07/01/18 07/01/18 Range/Units





  20:29 07:00 07:42 


 


Lymphocytes # (Manual)     (1.0-4.8)  k/uL


 


Metamyelocytes # (Man)     (0)  k/uL


 


Retic Count     (0.5-2.0)  %


 


Chloride    110 H  ()  mmol/L


 


Carbon Dioxide    18 L  (22-30)  mmol/L


 


BUN    38 H  (7-17)  mg/dL


 


Glucose    149 H  (74-99)  mg/dL


 


POC Glucose (mg/dL)  176 H  161 H   (75-99)  mg/dL


 


Calcium    7.9 L  (8.4-10.2)  mg/dL














  07/01/18 Range/Units





  11:36 


 


Lymphocytes # (Manual)   (1.0-4.8)  k/uL


 


Metamyelocytes # (Man)   (0)  k/uL


 


Retic Count   (0.5-2.0)  %


 


Chloride   ()  mmol/L


 


Carbon Dioxide   (22-30)  mmol/L


 


BUN   (7-17)  mg/dL


 


Glucose   (74-99)  mg/dL


 


POC Glucose (mg/dL)  161 H  (75-99)  mg/dL


 


Calcium   (8.4-10.2)  mg/dL








 Microbiology - Last 24 Hours (Table)











 07/01/18 03:35 Urine Culture - Preliminary





 Urine,Catheterized 














Assessment and Plan


Plan: 








Assessment and Plan


(1) Palmar plantar erythrodysaesthesia due to cytotoxic therapy


Narrative/Plan: 


 - Hydrocortisone cream and aquaphor to bedside


 - Continue Supportive Care


Current Visit: Yes   Status: Acute   Priority: High   Code(s): L27.1 - LOC SKIN 

ERUPTION DUE TO DRUGS AND MEDS TAKEN INTERNALLY; T45.1X5A - ADVERSE EFFECT OF 

ANTINEOPLASTIC AND IMMUNOSUP DRUGS, INIT   SNOMED Code(s): 316232843


   





(2) Diarrhea


Narrative/Plan: 


 - Not improving, Stool studies negative at this point, await pending studies


 - Lomotil, Imodium, and Questran, agree with addition of Metamucil


 - Monitor CMP, Mag


 - Restart LR as soon as IV Line replaced , appears clinically Dehydrated


 - Potentially increased from Radiation Therapy as well as Xeloda therapy, 

Continue to hold at this time.





Current Visit: Yes   Status: Acute   Priority: High   Code(s): R19.7 - DIARRHEA

, UNSPECIFIED   SNOMED Code(s): 02232927


   





(3) Mucositis due to radiation therapy


Narrative/Plan: 


 - Continue supportive medications, diligent mouth care, softer foods to reduce 

irritation. 


 - I will add decadron based mouth Rinse and nystatin - May combine with Kools 


Current Visit: Yes   Status: Acute   Priority: High   Code(s): K12.33 - ORAL 

MUCOSITIS (ULCERATIVE) DUE TO RADIATION   SNOMED Code(s): 349531435


   





(4) Pancytopenia due to antineoplastic chemotherapy


Narrative/Plan: 


 - Continue to Monitor CBC


 - Recently given supportive Parental Iron in office with IV Faraheme on 5/30/ 18 and 6/6/18


 - Transfusion support platlets less than 10, PRBC hgb less than 7


 - Monitor for s/s infection with leukopenia secondary antineoplastic therapy





(5) Rectal Adenocarcinoma - Undergoing current treatment with Xeolda and 

concurrent radiation therapy


 - Continue to Hold Xeloda and Radiation Therapy





(6) Persistent Nausea - 


 - Continue antiemetics and supportive care


 - 


(7) Protein Calorie Malnutrition - 


 - Albumin has decreased to 2.1 from 3.4 since admission, will need to increase 

supportive measures and nutritional status. 


 - May need to consider short term alternative form of Nutrition. 


 - Monitor CMP daily


 - Patient has IV access re-intiated and IVF


 - DIet Diary and protein supplements to continue, re-education on increasing 

po intake. 


 - Her appetite is good, just difficulty eating with nausea, diarrhea, and pain 

in mouth. Therefore will hold off on appetite stimulator at this time, could 

consider Marinol as option to help with nausea and appetite. 





(8) UTI


 - Urinalysis Positive - Send for Culture


 - Allergy to Cipro


 - Currently no IV Access, unable to swallow pills without vomiting or 

increased nausea, spoke to Dr. Mclean and will monitor symptoms of UTI and 

await culture while monitoring WBC, concern Abx will worsen Diarrhea, she 

remains asymptomatic in relation to UTI


 


(9) Metabolic Acidosis - Secondary Diarrhea. 


 - Per Primary Team


 - IVF Lactated Ringers with Sodium Bicarb











Physician Attestation: I have completed the full History and Physical of this 

patient and agree with the above dictation by Anahi Alcazar NP. Dictated as a 

scribe.

## 2018-07-01 NOTE — PN
PROGRESS NOTE



DATE OF SERVICE:

07/01/2018



Patient is a 78-year-old pleasant white female admitted to hospital with severe

diarrhea for the last 5-6 days duration.  Stool studies and C diff toxin was negative.

She was diagnosed with rectal cancer in April of this year, undergoing chemoradiation

therapy.  She has been on Questran as well as Lomotil one tablet 4 times daily that was

started yesterday.  She still had about 7 bowel movements through the night.  Denies

any abdominal pain.  Reports no nausea, vomiting.  This morning she had 2 loose watery

bowel movements.



PHYSICAL EXAMINATION:

Appears comfortable.  No apparent distress.  Vital signs are stable.  Blood pressure

100/58, pulse is 79, temperature 98.3. HEENT examination unremarkable. Conjunctivae are

pink.  Sclerae anicteric.  Oral cavity no lesions. Neck: No jugular venous distention

or lymph node enlargement.  Chest was clear to auscultation.  HEART:  Regular rate and

rhythm.  Abdomen was slight tenderness in the lower abdominal area.  EXTREMITIES:  No

pedal edema. Skin: No rashes.  NEUROLOGIC:  Alert and oriented x3.  No focal deficits.



LABS:

Labs from this morning,  CBC with differential count is within normal limits except for

BUN of 38 and creatinine 1.2.



IMPRESSION:

1. Diarrhea of 1 week duration.  Stool studies and C diff toxin negative.  She is on

    Questran 1 packet twice daily as well as Lomotil 1 tablet 4 times daily.  Continues

    to have persistent diarrhea.

2. History of rectal cancer diagnosed April 2018, presently undergoing chemoradiation

    therapy.



RECOMMENDATION:

1. Continue Questran 1 packet 4 g b.i.d.

2. Increase Lomotil 2 tablets 4 times daily.

3. Regular diet.

4. We will follow the patient closely during the hospital stay.

Thank you for this consultation.





MMODL / IJN: [id number] / Job#: [id number]

## 2018-07-02 LAB
ANION GAP SERPL CALC-SCNC: 13 MMOL/L
BUN SERPL-SCNC: 37 MG/DL (ref 7–17)
CALCIUM SPEC-MCNC: 8.2 MG/DL (ref 8.4–10.2)
CELLS COUNTED: 100
CHLORIDE SERPL-SCNC: 108 MMOL/L (ref 98–107)
CO2 SERPL-SCNC: 19 MMOL/L (ref 22–30)
ERYTHROCYTE [DISTWIDTH] IN BLOOD BY AUTOMATED COUNT: 3.46 M/UL (ref 3.8–5.4)
ERYTHROCYTE [DISTWIDTH] IN BLOOD: 19.3 % (ref 11.5–15.5)
GLUCOSE BLD-MCNC: 148 MG/DL (ref 75–99)
GLUCOSE BLD-MCNC: 156 MG/DL (ref 75–99)
GLUCOSE BLD-MCNC: 159 MG/DL (ref 75–99)
GLUCOSE BLD-MCNC: 188 MG/DL (ref 75–99)
GLUCOSE SERPL-MCNC: 160 MG/DL (ref 74–99)
HCT VFR BLD AUTO: 33.6 % (ref 34–46)
HGB BLD-MCNC: 10.5 GM/DL (ref 11.4–16)
LYMPHOCYTES # BLD MANUAL: 0.11 K/UL (ref 1–4.8)
MCH RBC QN AUTO: 30.3 PG (ref 25–35)
MCHC RBC AUTO-ENTMCNC: 31.2 G/DL (ref 31–37)
MCV RBC AUTO: 97.1 FL (ref 80–100)
METAMYELOCYTES # BLD: 0.11 K/UL
MONOCYTES # BLD MANUAL: 0.11 K/UL (ref 0–1)
NEUTROPHILS NFR BLD MANUAL: 79 %
NEUTS SEG # BLD MANUAL: 5 K/UL (ref 1.3–7.7)
PLATELET # BLD AUTO: 208 K/UL (ref 150–450)
POTASSIUM SERPL-SCNC: 4 MMOL/L (ref 3.5–5.1)
SODIUM SERPL-SCNC: 140 MMOL/L (ref 137–145)
WBC # BLD AUTO: 5.4 K/UL (ref 3.8–10.6)

## 2018-07-02 RX ADMIN — INSULIN ASPART SCH UNIT: 100 INJECTION, SOLUTION INTRAVENOUS; SUBCUTANEOUS at 12:39

## 2018-07-02 RX ADMIN — LISINOPRIL SCH: 20 TABLET ORAL at 08:15

## 2018-07-02 RX ADMIN — ISOSORBIDE MONONITRATE SCH: 30 TABLET, EXTENDED RELEASE ORAL at 08:15

## 2018-07-02 RX ADMIN — POTASSIUM CHLORIDE SCH MLS/HR: 14.9 INJECTION, SOLUTION INTRAVENOUS at 16:46

## 2018-07-02 RX ADMIN — LINAGLIPTIN SCH MG: 5 TABLET, FILM COATED ORAL at 08:17

## 2018-07-02 RX ADMIN — INSULIN ASPART SCH UNIT: 100 INJECTION, SOLUTION INTRAVENOUS; SUBCUTANEOUS at 08:16

## 2018-07-02 RX ADMIN — NYSTATIN SCH: 100000 SUSPENSION ORAL at 23:03

## 2018-07-02 RX ADMIN — DIPHENOXYLATE HYDROCHLORIDE AND ATROPINE SULFATE SCH: 2.5; .025 TABLET ORAL at 23:03

## 2018-07-02 RX ADMIN — LINAGLIPTIN SCH: 5 TABLET, FILM COATED ORAL at 08:15

## 2018-07-02 RX ADMIN — ACETAMINOPHEN SCH: 160 SOLUTION ORAL at 15:17

## 2018-07-02 RX ADMIN — Medication SCH: at 11:10

## 2018-07-02 RX ADMIN — DEXAMETHASONE SODIUM PHOSPHATE SCH MG: 4 INJECTION, SOLUTION INTRA-ARTICULAR; INTRALESIONAL; INTRAMUSCULAR; INTRAVENOUS; SOFT TISSUE at 06:15

## 2018-07-02 RX ADMIN — DEXAMETHASONE SODIUM PHOSPHATE SCH MG: 4 INJECTION, SOLUTION INTRA-ARTICULAR; INTRALESIONAL; INTRAMUSCULAR; INTRAVENOUS; SOFT TISSUE at 02:00

## 2018-07-02 RX ADMIN — CHOLESTYRAMINE SCH: 4 POWDER, FOR SUSPENSION ORAL at 15:17

## 2018-07-02 RX ADMIN — DIPHENOXYLATE HYDROCHLORIDE AND ATROPINE SULFATE SCH: 2.5; .025 TABLET ORAL at 17:25

## 2018-07-02 RX ADMIN — Medication SCH ML: at 06:13

## 2018-07-02 RX ADMIN — CHOLESTYRAMINE SCH: 4 POWDER, FOR SUSPENSION ORAL at 09:45

## 2018-07-02 RX ADMIN — NYSTATIN SCH: 100000 SUSPENSION ORAL at 17:25

## 2018-07-02 RX ADMIN — DIPHENOXYLATE HYDROCHLORIDE AND ATROPINE SULFATE SCH: 2.5; .025 TABLET ORAL at 12:40

## 2018-07-02 RX ADMIN — PRAVASTATIN SODIUM SCH MG: 40 TABLET ORAL at 20:58

## 2018-07-02 RX ADMIN — DEXAMETHASONE SODIUM PHOSPHATE SCH: 4 INJECTION, SOLUTION INTRA-ARTICULAR; INTRALESIONAL; INTRAMUSCULAR; INTRAVENOUS; SOFT TISSUE at 11:10

## 2018-07-02 RX ADMIN — POTASSIUM CHLORIDE SCH MLS/HR: 14.9 INJECTION, SOLUTION INTRAVENOUS at 09:46

## 2018-07-02 RX ADMIN — POTASSIUM CHLORIDE SCH MLS/HR: 14.9 INJECTION, SOLUTION INTRAVENOUS at 01:59

## 2018-07-02 RX ADMIN — DEXAMETHASONE SODIUM PHOSPHATE SCH: 4 INJECTION, SOLUTION INTRA-ARTICULAR; INTRALESIONAL; INTRAMUSCULAR; INTRAVENOUS; SOFT TISSUE at 17:26

## 2018-07-02 RX ADMIN — ONDANSETRON PRN MG: 2 INJECTION INTRAMUSCULAR; INTRAVENOUS at 08:50

## 2018-07-02 RX ADMIN — Medication SCH ML: at 20:58

## 2018-07-02 RX ADMIN — ASPIRIN 81 MG CHEWABLE TABLET SCH: 81 TABLET CHEWABLE at 08:15

## 2018-07-02 RX ADMIN — Medication SCH: at 00:12

## 2018-07-02 RX ADMIN — PSYLLIUM HUSK SCH GM: 3.4 POWDER ORAL at 08:17

## 2018-07-02 RX ADMIN — PANTOPRAZOLE SODIUM SCH MG: 40 TABLET, DELAYED RELEASE ORAL at 08:17

## 2018-07-02 RX ADMIN — PSYLLIUM HUSK SCH: 3.4 POWDER ORAL at 08:15

## 2018-07-02 RX ADMIN — INSULIN ASPART SCH: 100 INJECTION, SOLUTION INTRAVENOUS; SUBCUTANEOUS at 17:25

## 2018-07-02 RX ADMIN — LISINOPRIL SCH MG: 20 TABLET ORAL at 08:17

## 2018-07-02 RX ADMIN — ISOSORBIDE MONONITRATE SCH MG: 30 TABLET, EXTENDED RELEASE ORAL at 08:17

## 2018-07-02 RX ADMIN — INSULIN ASPART SCH UNIT: 100 INJECTION, SOLUTION INTRAVENOUS; SUBCUTANEOUS at 20:58

## 2018-07-02 RX ADMIN — ONDANSETRON PRN MG: 2 INJECTION INTRAMUSCULAR; INTRAVENOUS at 16:45

## 2018-07-02 RX ADMIN — CHOLESTYRAMINE SCH: 4 POWDER, FOR SUSPENSION ORAL at 17:25

## 2018-07-02 RX ADMIN — DIPHENOXYLATE HYDROCHLORIDE AND ATROPINE SULFATE SCH EACH: 2.5; .025 TABLET ORAL at 08:17

## 2018-07-02 RX ADMIN — POTASSIUM CHLORIDE SCH MLS/HR: 14.9 INJECTION, SOLUTION INTRAVENOUS at 06:16

## 2018-07-02 RX ADMIN — POTASSIUM CHLORIDE SCH: 14.9 INJECTION, SOLUTION INTRAVENOUS at 11:11

## 2018-07-02 RX ADMIN — NYSTATIN SCH: 100000 SUSPENSION ORAL at 12:40

## 2018-07-02 RX ADMIN — NYSTATIN SCH UNIT: 100000 SUSPENSION ORAL at 08:17

## 2018-07-02 RX ADMIN — METOPROLOL SUCCINATE SCH MG: 25 TABLET, EXTENDED RELEASE ORAL at 08:17

## 2018-07-02 RX ADMIN — PSYLLIUM HUSK SCH: 3.4 POWDER ORAL at 20:57

## 2018-07-02 RX ADMIN — METOPROLOL SUCCINATE SCH: 25 TABLET, EXTENDED RELEASE ORAL at 08:15

## 2018-07-02 RX ADMIN — ACETAMINOPHEN SCH ML: 160 SOLUTION ORAL at 08:15

## 2018-07-02 RX ADMIN — PANTOPRAZOLE SODIUM SCH: 40 TABLET, DELAYED RELEASE ORAL at 08:15

## 2018-07-02 RX ADMIN — Medication SCH: at 15:17

## 2018-07-02 RX ADMIN — ACETAMINOPHEN SCH: 160 SOLUTION ORAL at 23:05

## 2018-07-02 RX ADMIN — ASPIRIN 81 MG CHEWABLE TABLET SCH MG: 81 TABLET CHEWABLE at 08:17

## 2018-07-02 NOTE — PN
PROGRESS NOTE



DATE OF SERVICE:

07/01/2018



PRESENTING COMPLAINT:

Weak and tired.



INTERVAL HISTORY:

This patient with rectal cancer status post chemoradiation presented with mucositis and

severe diarrhea.  I had added Metamucil yesterday. By this evening, patient has only

had one bowel movement. Also patient is tolerating her shakes. Given some lactose

intolerance, this is only thing she can take, but she has been doing okay with that.

Oral aphthous ulcer also is doing better.   was at the bedside.



REVIEW OF SYSTEMS:

Review of systems done for constitutional, cardiovascular, GI, pulmonary; relevant

findings as above.



CURRENT MEDICATIONS:

Current medications are reviewed that include Questran, Lomotil, IV bicarb and IV

fluids, Metamucil.



PHYSICAL EXAMINATION:

On examination, temperature 97.8, pulse 87, respirations 16, blood pressure 117/57,

pulse ox 99% on room air.

GENERAL APPEARANCE:  Lying in bed, tired-appearing.

EYES: Pupils equal. Conjunctivae pale.

HENT: External appearance of nose and ears normal. Oral cavity a bit dry, some aphthous

ulcers.

NECK: JVD not raised.  Mass not palpable.

RESPIRATORY: Effort _____

Lungs are clear.

CARDIOVASCULAR: First and second sounds normal. No edema.

ABDOMEN: Mild tenderness. Liver and spleen not palpable.  No guarding or rigidity.

PSYCHIATRY:  Alert and oriented x3.  Mood and affect tired appearing.



INVESTIGATIONS:

Potassium 3.9.  BUN 38, creatinine 1.02. Bicarb 18.



ASSESSMENT:

1. Acute severe diarrhea from chemo radiation, mucositis with improvement.

2. Acute mucositis of the oral cavity with some improvement.

3. Bicytopenia as side effect chemotherapy.

4. Adenocarcinoma of rectum with local invasion, status post chemo radiation.

5. Metabolic acidosis.

6. Diabetes mellitus type 2 on oral hypoglycemic.

7. Essential hypertension.

8. Obesity; body mass index 32.1.

9. Hypoalbuminemia probably is an acute phase reactant.



PLAN:

I had a lengthy talk with the patient and  yet again today.  The patient has

responded to current treatment.  I did speak to the aide.  I did talk to the nurse to

make sure the patient gets milk shakes.  Okayed by Dr. Horta to give the milkshakes and

to give liquids at room temperature and told the aide to get the patient out of bed,

looking at discharge to rehab.





MMODL / WALLACEN: [id number] / Job#: [id number]

## 2018-07-02 NOTE — PN
PROGRESS NOTE



DATE OF SERVICE:

07/02/2018



PRESENTING COMPLAINT:

Weak and tired.



INTERVAL HISTORY:

Patient is status post chemoradiation for rectal adenocarcinoma.  Diarrhea is much

improved; had about 2 or 3 bowel movements in the last 24 hours, but not eating much.

Tired and rundown.   at the bedside.



REVIEW OF SYSTEMS:

Done for constitutional, cardiovascular, GI, pulmonary; relevant findings as above.



CURRENT MEDICATIONS:

Reviewed. They include Questran, LR, Lomotil.



PHYSICAL EXAMINATION:

Temperature 97.8, pulse 90, respiration 17, blood pressure 117/63, pulse ox 96% on room

air.

GENERAL APPEARANCE:  Lying in bed, tired-appearing.

EYES: Pupils equal. Conjunctivae normal.

HEENT: External appearance of nose and ears normal. Oral cavity dry. Some aphthous

ulcers.

NECK: JVD not raised.  Mass not palpable.

RESPIRATORY: Effort normal. Lungs are clear.

CARDIOVASCULAR: First and second sounds normal. No edema.

ABDOMEN:  Soft. Minimal tenderness. Liver and spleen not palpable.

PSYCHIATRY:  Alert and oriented x3.  Mood and affect tired-appearing.



INVESTIGATIONS:

White count 5.4, hemoglobin 10.5, potassium 4, BUN 37, creatinine 1.18.



ASSESSMENT:

1. Acute severe diarrhea from chemoradiation, mucositis, with significant improvement.

2. Acute mucositis in oral cavity with some improvement.

3. Bicytopenia as a side effect of chemotherapy.

4. 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

5. Metabolic acidosis.

6. Diabetes mellitus, type 2, on oral hypoglycemic.

7. Essential hypertension.

8. Obesity; body mass index 32.1.

9. Hypoalbuminemia, possibly acute phase reactant.

10.Anorexia.



PLAN:

Hospice spoke to me. He started to inquire whether patient is a candidate for hospice.

I did tell him that actually patient's diarrhea is quite improved and we have to wait

for the effect of chemotherapy to wear off, and that I will speak to Dr. uHang from

Oncology.  In the meantime, we will continue with IV fluids and encourage oral intake.

Then I spoke to Dr. Huang, and he said the patient's cancer is rather contained, and we

should wait for the side effects of the chemo pill to wear off; and everybody's

response is variable. At this point, will continue with IV fluids, supportive care.



Total time today spent was about 40 minutes, with over 25 minutes of discussion.





MMODL / IJN: [id number] / Job#: [id number]

## 2018-07-03 VITALS
SYSTOLIC BLOOD PRESSURE: 163 MMHG | TEMPERATURE: 97.4 F | HEART RATE: 82 BPM | DIASTOLIC BLOOD PRESSURE: 71 MMHG | RESPIRATION RATE: 18 BRPM

## 2018-07-03 LAB
ALBUMIN SERPL-MCNC: 2 G/DL (ref 3.5–5)
ALBUMIN SERPL-MCNC: 2.1 G/DL (ref 3.5–5)
ALP SERPL-CCNC: 44 U/L (ref 38–126)
ALP SERPL-CCNC: 57 U/L (ref 38–126)
ALT SERPL-CCNC: 22 U/L (ref 9–52)
ALT SERPL-CCNC: 25 U/L (ref 9–52)
ANION GAP SERPL CALC-SCNC: 12 MMOL/L
ANION GAP SERPL CALC-SCNC: 9 MMOL/L
AST SERPL-CCNC: 14 U/L (ref 14–36)
AST SERPL-CCNC: 33 U/L (ref 14–36)
BUN SERPL-SCNC: 46 MG/DL (ref 7–17)
BUN SERPL-SCNC: 47 MG/DL (ref 7–17)
CALCIUM SPEC-MCNC: 8 MG/DL (ref 8.4–10.2)
CALCIUM SPEC-MCNC: 8.2 MG/DL (ref 8.4–10.2)
CHLORIDE SERPL-SCNC: 106 MMOL/L (ref 98–107)
CHLORIDE SERPL-SCNC: 110 MMOL/L (ref 98–107)
CO2 SERPL-SCNC: 20 MMOL/L (ref 22–30)
CO2 SERPL-SCNC: 25 MMOL/L (ref 22–30)
GLUCOSE BLD-MCNC: 160 MG/DL (ref 75–99)
GLUCOSE BLD-MCNC: 164 MG/DL (ref 75–99)
GLUCOSE SERPL-MCNC: 135 MG/DL (ref 74–99)
GLUCOSE SERPL-MCNC: 163 MG/DL (ref 74–99)
POTASSIUM SERPL-SCNC: 3.7 MMOL/L (ref 3.5–5.1)
POTASSIUM SERPL-SCNC: 5.4 MMOL/L (ref 3.5–5.1)
PROT SERPL-MCNC: 3.9 G/DL (ref 6.3–8.2)
PROT SERPL-MCNC: 4.1 G/DL (ref 6.3–8.2)
SODIUM SERPL-SCNC: 140 MMOL/L (ref 137–145)
SODIUM SERPL-SCNC: 142 MMOL/L (ref 137–145)

## 2018-07-03 RX ADMIN — DEXAMETHASONE SODIUM PHOSPHATE SCH MG: 4 INJECTION, SOLUTION INTRA-ARTICULAR; INTRALESIONAL; INTRAMUSCULAR; INTRAVENOUS; SOFT TISSUE at 12:20

## 2018-07-03 RX ADMIN — CHOLESTYRAMINE SCH: 4 POWDER, FOR SUSPENSION ORAL at 17:51

## 2018-07-03 RX ADMIN — ISOSORBIDE MONONITRATE SCH: 30 TABLET, EXTENDED RELEASE ORAL at 09:22

## 2018-07-03 RX ADMIN — LISINOPRIL SCH MG: 20 TABLET ORAL at 08:43

## 2018-07-03 RX ADMIN — POTASSIUM CHLORIDE SCH: 14.9 INJECTION, SOLUTION INTRAVENOUS at 12:30

## 2018-07-03 RX ADMIN — POTASSIUM CHLORIDE SCH MLS/HR: 14.9 INJECTION, SOLUTION INTRAVENOUS at 06:11

## 2018-07-03 RX ADMIN — ASPIRIN 81 MG CHEWABLE TABLET SCH MG: 81 TABLET CHEWABLE at 08:50

## 2018-07-03 RX ADMIN — Medication SCH ML: at 12:19

## 2018-07-03 RX ADMIN — CHOLESTYRAMINE SCH: 4 POWDER, FOR SUSPENSION ORAL at 09:23

## 2018-07-03 RX ADMIN — DIPHENOXYLATE HYDROCHLORIDE AND ATROPINE SULFATE SCH: 2.5; .025 TABLET ORAL at 09:22

## 2018-07-03 RX ADMIN — NYSTATIN SCH UNIT: 100000 SUSPENSION ORAL at 12:21

## 2018-07-03 RX ADMIN — PANTOPRAZOLE SODIUM SCH MG: 40 TABLET, DELAYED RELEASE ORAL at 08:51

## 2018-07-03 RX ADMIN — DIPHENOXYLATE HYDROCHLORIDE AND ATROPINE SULFATE SCH EACH: 2.5; .025 TABLET ORAL at 12:21

## 2018-07-03 RX ADMIN — METOPROLOL SUCCINATE SCH MG: 25 TABLET, EXTENDED RELEASE ORAL at 08:43

## 2018-07-03 RX ADMIN — Medication SCH: at 01:48

## 2018-07-03 RX ADMIN — INSULIN ASPART SCH UNIT: 100 INJECTION, SOLUTION INTRAVENOUS; SUBCUTANEOUS at 08:50

## 2018-07-03 RX ADMIN — Medication SCH ML: at 06:11

## 2018-07-03 RX ADMIN — PSYLLIUM HUSK SCH: 3.4 POWDER ORAL at 09:23

## 2018-07-03 RX ADMIN — NYSTATIN SCH UNIT: 100000 SUSPENSION ORAL at 08:51

## 2018-07-03 RX ADMIN — POTASSIUM CHLORIDE SCH MLS/HR: 14.9 INJECTION, SOLUTION INTRAVENOUS at 02:33

## 2018-07-03 RX ADMIN — ACETAMINOPHEN SCH ML: 160 SOLUTION ORAL at 08:51

## 2018-07-03 RX ADMIN — INSULIN ASPART SCH UNIT: 100 INJECTION, SOLUTION INTRAVENOUS; SUBCUTANEOUS at 12:21

## 2018-07-03 RX ADMIN — LINAGLIPTIN SCH: 5 TABLET, FILM COATED ORAL at 09:23

## 2018-07-03 RX ADMIN — DEXAMETHASONE SODIUM PHOSPHATE SCH: 4 INJECTION, SOLUTION INTRA-ARTICULAR; INTRALESIONAL; INTRAMUSCULAR; INTRAVENOUS; SOFT TISSUE at 01:48

## 2018-07-03 RX ADMIN — DEXAMETHASONE SODIUM PHOSPHATE SCH MG: 4 INJECTION, SOLUTION INTRA-ARTICULAR; INTRALESIONAL; INTRAMUSCULAR; INTRAVENOUS; SOFT TISSUE at 06:11

## 2018-07-03 NOTE — DS
DISCHARGE SUMMARY



DATE OF ADMISSION:

6/22/18.



DATE OF DISCHARGE:

7/3/18.



FINAL DIAGNOSES:

1. Acute severe diarrhea from chemoradiation mucositis.

2. Acute mucositis of the oral cavity secondary to chemotherapy.

3. Bicytopenia as a side effect of chemotherapy.

4. 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

5. Metabolic acidosis.

6. Diabetes mellitus type 2 on oral hypoglycemic.

7. Essential hypertension.

8. Obesity; BMI 32.1.

9. Hypoalbuminemia possibly acute phase reactant.

10.Anorexia from underlying malignancy and chemotherapy.

11.Medical debility.



CONSULTATIONS:

1. Dr. Huang from Oncology.

2. Dr. NELIDA Horta from GI.

3. Dr. Akash Martínez from Cardiology.



HOSPITAL COURSE:

This patient with local rectal cancer status post chemotherapy, presented with

diarrhea, weak, tired.  This was felt to be a side effect of chemoradiation.

Eventually patient's diarrhea resolved.  The patient's appetite is still not good, but

encouraged to increase appetite.  Received IV fluids. Because of localized cancer the

prognosis is rather favorable.  We have to wait for the effect of the 5 fluorouracil to

wear off. The patient encouraged to take increased oral intake.  I spoke with the 2

daughters today and the patient yet again. Also spoke earlier to the patient's .

With physical therapy hopefully patient should feel better.



On examination, abscess ulcers in the mouth improving.

Abdomen: Soft, nontender.

Psych: AO x3.

Lungs: Clear.



DISCHARGE MEDICATIONS:

1. Metoprolol succinate 12.5 p.o. daily.

2. Januvia 100 mg p.o. daily.

3. Pravachol 40 mg q.h.s.

4. Zofran 4 mg q.8h p.r.n.

5. Compazine 10 mg p.o. daily p.r.n.

6. Tylenol t.i.d. p.r.n.

7. Aspirin 81 mg a day.

8. Imdur ER 50 mg a day.

9. Xylocaine viscous 2% 30 mL p.o. t.i.d. may be changed to p.r.n. after some time.

10.Zestril 40 mg daily.

11.Imodium 2 mg p.o. t.i.d.

12.Maalox 30 mL p.o. t.i.d.

13.Protonix 40 mg with breakfast.

14.Prednisone taper.

15.Keflex 250 mg p.o. q.i.d. 20 capsules.

16.Accu-Cheks daily in the morning.

17.Aquaphor topical b.i.d. p.r.n.

18.Metamucil 6 grams p.o. b.i.d.

Follow up with Dr. Vega in 1 week, follow Dr. NOEMI Horta on 8/22/18, follow up with Dr. Balderrama on 7/10/18, follow up with Dr. James at the [facility], follow Dr. Lovell after

discharge from the [facility].



DISPOSITION:

[facility].



PROGNOSIS:

Guarded.



CODE STATUS:

FULL CODE



Discussion and discharge planning more than 35 minutes.





MMODL / IJN: [id number] / Job#: [id number]

## 2018-07-03 NOTE — P.PN
Subjective


Progress Note Date: 07/03/18


Principal diagnosis: 





Rectal Cancer with Diarrhea and Anemia





6/30/18 - Patient seen in follow-up today. She is still very nauseated, unable 

to eat as her mouth is sore and decreased appetite, and continued diarrhea. 

 at bedside and concerned about her nutritional status. 





7/1/18 - Patient seen in follow-up today. She is tolerating protein shakes well

, Her mouth is mildly improving and they obtained IV access so she is receiving 

IV Hydration 





7/3/18 - Idamae is doing better today. her diarrhea has improved, almost 

resolved, nausea is better. Her biggest challenge is appetite and weakness. PT/

OT working with her. Daughter, son and  at bedside. 





Objective





- Vital Signs


Vital signs: 


 Vital Signs











Temp  97.4 F L  07/03/18 06:34


 


Pulse  82   07/03/18 06:34


 


Resp  18   07/03/18 06:34


 


BP  163/71   07/03/18 06:34


 


Pulse Ox  95   07/03/18 06:34








 Intake & Output











 07/02/18 07/03/18 07/03/18





 18:59 06:59 18:59


 


Intake Total 1000 590 500


 


Balance 1000 590 500


 


Weight 87.5 kg  


 


Intake:   


 


  Intake, IV Titration 1000  500





  Amount   


 


    Lactated Ringers 1,000 ml 1000  500





    @ 125 mls/hr IV .Q8H24M   





    PRETTY with Sodium Bicarb (1   





    Meq/ml) 50 ml Rx#:   





    [id number]   


 


  Oral  590 


 


Other:   


 


  Voiding Method Bedside Commode Bedside Commode Bedside Commode





 Diaper Diaper Diaper





 Incontinent Incontinent Incontinent


 


  # Voids  2 














- Constitutional


General appearance: Present: cooperative, no acute distress, thin





- EENT


Eyes: Present: EOMI, PERRLA, dentition normal


ENT: Present: NA/AT, normal oropharynx





- Neck


Details: 





supple, trachea midline





Neck: Present: normal ROM





- Respiratory


Respiratory: bilateral: diminished (bibasilar)





- Cardiovascular


Rhythm: regular


Heart sounds: normal: S1, S2





- Gastrointestinal


General gastrointestinal: Present: normal bowel sounds, soft, tenderness





- Integumentary


Integumentary: Present: pale





- Neurologic


Neurologic Comment(s): 





No focal defects


Neurologic: Present: CNII-XII intact





- Musculoskeletal


Musculoskeletal: Present: generalized weakness, strength equal bilaterally





- Psychiatric


Psychiatric: Present: A&O x's 3, appropriate affect, intact judgment & insight





- Labs


CBC & Chem 7: 


 07/02/18 06:27





 07/03/18 15:34


Labs: 


 Abnormal Lab Results - Last 24 Hours (Table)











  07/02/18 07/03/18 07/03/18 Range/Units





  20:49 07:02 07:14 


 


Potassium    5.4 H  (3.5-5.1)  mmol/L


 


Chloride    110 H  ()  mmol/L


 


Carbon Dioxide    20 L  (22-30)  mmol/L


 


BUN    46 H  (7-17)  mg/dL


 


Creatinine    1.08 H  (0.52-1.04)  mg/dL


 


Glucose    163 H  (74-99)  mg/dL


 


POC Glucose (mg/dL)  159 H  160 H   (75-99)  mg/dL


 


Calcium    8.2 L  (8.4-10.2)  mg/dL


 


Total Protein    4.1 L  (6.3-8.2)  g/dL


 


Albumin    2.1 L  (3.5-5.0)  g/dL














  07/03/18 07/03/18 Range/Units





  11:51 15:34 


 


Potassium    (3.5-5.1)  mmol/L


 


Chloride    ()  mmol/L


 


Carbon Dioxide    (22-30)  mmol/L


 


BUN   47 H  (7-17)  mg/dL


 


Creatinine   1.10 H  (0.52-1.04)  mg/dL


 


Glucose   135 H  (74-99)  mg/dL


 


POC Glucose (mg/dL)  164 H   (75-99)  mg/dL


 


Calcium   8.0 L  (8.4-10.2)  mg/dL


 


Total Protein   3.9 L  (6.3-8.2)  g/dL


 


Albumin   2.0 L  (3.5-5.0)  g/dL








 Microbiology - Last 24 Hours (Table)











 07/01/18 03:35 Urine Culture - Final





 Urine,Catheterized    Klebsiella pneumoniae





    Klebsiella pneumoniae#2














Assessment and Plan


Plan: 








Assessment and Plan


(1) Palmar plantar erythrodysaesthesia due to cytotoxic therapy - Resolved


Narrative/Plan: 


 - Hydrocortisone cream and aquaphor to bedside


 - Continue Supportive Care


Current Visit: Yes   Status: Acute   Priority: High   Code(s): L27.1 - LOC SKIN 

ERUPTION DUE TO DRUGS AND MEDS TAKEN INTERNALLY; T45.1X5A - ADVERSE EFFECT OF 

ANTINEOPLASTIC AND IMMUNOSUP DRUGS, INIT   SNOMED Code(s): 873157945


   





(2) Diarrhea - Improved


Narrative/Plan: 


 - Not improving, Stool studies negative at this point, await pending studies


 - Lomotil, Imodium, and Questran, agree with addition of Metamucil


 - Monitor CMP, Mag


 - Restart LR as soon as IV Line replaced , appears clinically Dehydrated


 - Potentially increased from Radiation Therapy as well as Xeloda therapy, 

Continue to hold at this time.





Current Visit: Yes   Status: Acute   Priority: High   Code(s): R19.7 - DIARRHEA

, UNSPECIFIED   SNOMED Code(s): 55395485


   





(3) Mucositis due to radiation therapy - Improving 


Narrative/Plan: 


 - Continue supportive medications, diligent mouth care, softer foods to reduce 

irritation. 


 - I will add decadron based mouth Rinse and nystatin - May combine with Kools 


Current Visit: Yes   Status: Acute   Priority: High   Code(s): K12.33 - ORAL 

MUCOSITIS (ULCERATIVE) DUE TO RADIATION   SNOMED Code(s): 298711267


   





(4) Pancytopenia due to antineoplastic chemotherapy - Improved hgb, WBC/PLT 

have recovered


Narrative/Plan: 


 - Continue to Monitor CBC


 - Recently given supportive Parental Iron in office with IV Faraheme on 5/30/ 18 and 6/6/18


 - Transfusion support platlets less than 10, PRBC hgb less than 7


 - Monitor for s/s infection with leukopenia secondary antineoplastic therapy





(5) Rectal Adenocarcinoma - Undergoing current treatment with Xeolda and 

concurrent radiation therapy


 - Continue to Hold Xeloda and Radiation Therapy





(6) Persistent Nausea - Improving


 - Continue antiemetics and supportive care


 - small frequent meals


(7) Protein Calorie Malnutrition - 


 - Albumin has decreased since admission, will need to increase supportive 

measures and nutritional status. 


 - May need to consider short term alternative form of Nutrition. 


 - Monitor CMP daily


 - Patient has IV access re-intiated and IVF


 - DIet Diary and protein supplements to continue, re-education on increasing 

po intake. 


 - Her appetite is good, just difficulty eating with nausea, diarrhea, and pain 

in mouth. Therefore will hold off on appetite stimulator at this time, could 

consider megace as option to help with nausea and appetite. 





(8) UTI


 - Urinalysis Positive - Send for Culture


 - Allergy to Cipro


 - Currently no IV Access, unable to swallow pills without vomiting or 

increased nausea, spoke to Dr. Mclean and will monitor symptoms of UTI and 

await culture while monitoring WBC, concern Abx will worsen Diarrhea, she 

remains asymptomatic in relation to UTI


 


(9) Metabolic Acidosis - Secondary Diarrhea. 


 - Per Primary Team


 - IVF Lactated Ringers with Sodium Bicarb





(10) Chronic Illness Myopathy


 - We will continue to hold chemotherapy at this time and allow patient to 

recover in rehabilitation ECF


 - She will follow-up after Rehab with Dr. Balderrama in Office











Physician Attestation: I have completed the full History and Physical of this 

patient and agree with the above dictation by Anahi Alcazar NP. Dictated as a 

scribe.

## 2018-07-06 ENCOUNTER — HOSPITAL ENCOUNTER (INPATIENT)
Dept: HOSPITAL 47 - EC | Age: 78
DRG: 208 | End: 2018-07-06
Attending: HOSPITALIST | Admitting: HOSPITALIST
Payer: MEDICARE

## 2018-07-06 VITALS — BODY MASS INDEX: 30.9 KG/M2

## 2018-07-06 VITALS — HEART RATE: 107 BPM

## 2018-07-06 VITALS — TEMPERATURE: 97.1 F

## 2018-07-06 DIAGNOSIS — I48.0: ICD-10-CM

## 2018-07-06 DIAGNOSIS — E11.9: ICD-10-CM

## 2018-07-06 DIAGNOSIS — Z66: ICD-10-CM

## 2018-07-06 DIAGNOSIS — Z79.899: ICD-10-CM

## 2018-07-06 DIAGNOSIS — R32: ICD-10-CM

## 2018-07-06 DIAGNOSIS — I46.9: ICD-10-CM

## 2018-07-06 DIAGNOSIS — Z53.8: ICD-10-CM

## 2018-07-06 DIAGNOSIS — E66.9: ICD-10-CM

## 2018-07-06 DIAGNOSIS — N17.0: ICD-10-CM

## 2018-07-06 DIAGNOSIS — E87.2: ICD-10-CM

## 2018-07-06 DIAGNOSIS — Z92.21: ICD-10-CM

## 2018-07-06 DIAGNOSIS — Z82.49: ICD-10-CM

## 2018-07-06 DIAGNOSIS — I95.9: ICD-10-CM

## 2018-07-06 DIAGNOSIS — J96.91: ICD-10-CM

## 2018-07-06 DIAGNOSIS — I10: ICD-10-CM

## 2018-07-06 DIAGNOSIS — Z90.710: ICD-10-CM

## 2018-07-06 DIAGNOSIS — E78.5: ICD-10-CM

## 2018-07-06 DIAGNOSIS — Z79.4: ICD-10-CM

## 2018-07-06 DIAGNOSIS — E86.0: ICD-10-CM

## 2018-07-06 DIAGNOSIS — C20: ICD-10-CM

## 2018-07-06 DIAGNOSIS — M81.0: ICD-10-CM

## 2018-07-06 DIAGNOSIS — Z88.1: ICD-10-CM

## 2018-07-06 DIAGNOSIS — Z79.82: ICD-10-CM

## 2018-07-06 DIAGNOSIS — J69.0: Primary | ICD-10-CM

## 2018-07-06 DIAGNOSIS — R79.1: ICD-10-CM

## 2018-07-06 LAB
ALBUMIN SERPL-MCNC: 2.3 G/DL (ref 3.5–5)
ALP SERPL-CCNC: 71 U/L (ref 38–126)
ALT SERPL-CCNC: 30 U/L (ref 9–52)
ANION GAP SERPL CALC-SCNC: 14 MMOL/L
APTT BLD: 24.1 SEC (ref 22–30)
AST SERPL-CCNC: 22 U/L (ref 14–36)
BILIRUB UR QL STRIP.AUTO: (no result)
BUN SERPL-SCNC: 83 MG/DL (ref 7–17)
CALCIUM SPEC-MCNC: 7.9 MG/DL (ref 8.4–10.2)
CELLS COUNTED: 200
CHLORIDE SERPL-SCNC: 102 MMOL/L (ref 98–107)
CK SERPL-CCNC: 64 U/L (ref 30–135)
CO2 BLDA-SCNC: 14 MMOL/L (ref 19–24)
CO2 SERPL-SCNC: 28 MMOL/L (ref 22–30)
D DIMER PPP FEU-MCNC: 30.4 MG/L FEU (ref ?–0.6)
ERYTHROCYTE [DISTWIDTH] IN BLOOD BY AUTOMATED COUNT: 4.05 M/UL (ref 3.8–5.4)
ERYTHROCYTE [DISTWIDTH] IN BLOOD: 17.8 % (ref 11.5–15.5)
GLUCOSE BLD-MCNC: 183 MG/DL (ref 75–99)
GLUCOSE BLD-MCNC: 219 MG/DL (ref 75–99)
GLUCOSE SERPL-MCNC: 164 MG/DL (ref 74–99)
HCO3 BLDA-SCNC: 13 MMOL/L (ref 21–25)
HCT VFR BLD AUTO: 38.2 % (ref 34–46)
HGB BLD-MCNC: 12 GM/DL (ref 11.4–16)
INR PPP: 1.5 (ref ?–1.2)
LYMPHOCYTES # BLD MANUAL: 0.5 K/UL (ref 1–4.8)
MCH RBC QN AUTO: 29.5 PG (ref 25–35)
MCHC RBC AUTO-ENTMCNC: 31.4 G/DL (ref 31–37)
MCV RBC AUTO: 94.1 FL (ref 80–100)
MONOCYTES # BLD MANUAL: 0.34 K/UL (ref 0–1)
NEUTROPHILS NFR BLD MANUAL: 77 %
NEUTS SEG # BLD MANUAL: 4.8 K/UL (ref 1.3–7.7)
PCO2 BLDA: 36 MMHG (ref 35–45)
PH BLDA: 7.16 [PH] (ref 7.35–7.45)
PH UR: 5 [PH] (ref 5–8)
PLATELET # BLD AUTO: 198 K/UL (ref 150–450)
PO2 BLDA: 67 MMHG (ref 83–108)
POTASSIUM SERPL-SCNC: 3.5 MMOL/L (ref 3.5–5.1)
PROT SERPL-MCNC: 4.3 G/DL (ref 6.3–8.2)
PT BLD: 13.8 SEC (ref 9–12)
RBC UR QL: 2 /HPF (ref 0–5)
SODIUM SERPL-SCNC: 144 MMOL/L (ref 137–145)
SP GR UR: 1.02 (ref 1–1.03)
SQUAMOUS UR QL AUTO: <1 /HPF (ref 0–4)
TROPONIN I SERPL-MCNC: 0.12 NG/ML (ref 0–0.03)
UROBILINOGEN UR QL STRIP: 2 MG/DL (ref ?–2)
WBC # BLD AUTO: 5.6 K/UL (ref 3.8–10.6)
WBC #/AREA URNS HPF: 1 /HPF (ref 0–5)

## 2018-07-06 PROCEDURE — 85025 COMPLETE CBC W/AUTO DIFF WBC: CPT

## 2018-07-06 PROCEDURE — 83605 ASSAY OF LACTIC ACID: CPT

## 2018-07-06 PROCEDURE — 96361 HYDRATE IV INFUSION ADD-ON: CPT

## 2018-07-06 PROCEDURE — 82805 BLOOD GASES W/O2 SATURATION: CPT

## 2018-07-06 PROCEDURE — 96365 THER/PROPH/DIAG IV INF INIT: CPT

## 2018-07-06 PROCEDURE — 85379 FIBRIN DEGRADATION QUANT: CPT

## 2018-07-06 PROCEDURE — 0BH17EZ INSERTION OF ENDOTRACHEAL AIRWAY INTO TRACHEA, VIA NATURAL OR ARTIFICIAL OPENING: ICD-10-PCS

## 2018-07-06 PROCEDURE — 93005 ELECTROCARDIOGRAM TRACING: CPT

## 2018-07-06 PROCEDURE — 94002 VENT MGMT INPAT INIT DAY: CPT

## 2018-07-06 PROCEDURE — 5A12012 PERFORMANCE OF CARDIAC OUTPUT, SINGLE, MANUAL: ICD-10-PCS

## 2018-07-06 PROCEDURE — 71045 X-RAY EXAM CHEST 1 VIEW: CPT

## 2018-07-06 PROCEDURE — 96366 THER/PROPH/DIAG IV INF ADDON: CPT

## 2018-07-06 PROCEDURE — 96376 TX/PRO/DX INJ SAME DRUG ADON: CPT

## 2018-07-06 PROCEDURE — 36415 COLL VENOUS BLD VENIPUNCTURE: CPT

## 2018-07-06 PROCEDURE — 85730 THROMBOPLASTIN TIME PARTIAL: CPT

## 2018-07-06 PROCEDURE — 99285 EMERGENCY DEPT VISIT HI MDM: CPT

## 2018-07-06 PROCEDURE — 80053 COMPREHEN METABOLIC PANEL: CPT

## 2018-07-06 PROCEDURE — 87040 BLOOD CULTURE FOR BACTERIA: CPT

## 2018-07-06 PROCEDURE — 93306 TTE W/DOPPLER COMPLETE: CPT

## 2018-07-06 PROCEDURE — 85610 PROTHROMBIN TIME: CPT

## 2018-07-06 PROCEDURE — 84484 ASSAY OF TROPONIN QUANT: CPT

## 2018-07-06 PROCEDURE — 82550 ASSAY OF CK (CPK): CPT

## 2018-07-06 PROCEDURE — 82553 CREATINE MB FRACTION: CPT

## 2018-07-06 PROCEDURE — 96368 THER/DIAG CONCURRENT INF: CPT

## 2018-07-06 PROCEDURE — 81001 URINALYSIS AUTO W/SCOPE: CPT

## 2018-07-06 PROCEDURE — 36600 WITHDRAWAL OF ARTERIAL BLOOD: CPT

## 2018-07-06 PROCEDURE — 5A1935Z RESPIRATORY VENTILATION, LESS THAN 24 CONSECUTIVE HOURS: ICD-10-PCS

## 2018-07-06 NOTE — P.NPCON
History of Present Illness





- Reason for Consult


acute renal failure





- History of Present Illness





Reason for consultation: Acute kidney injury





History of present illness:


Patient is a 78-year-old female seen in renal consultation for acute kidney 

injury.  Her baseline creatinine is 1 and was elevated at 21 admission.  

Patient is currently resting in bed and is a poor historian.  She was sent from 

[facility] for dyspnea.  Apparently she had a coughing 

episode and there was concern for aspiration.  She is currently noted to be in 

atrial fibrillation with RVR.  Her blood pressure was in the systolic 90s and 

she did receive 3 L of IV fluids.  She is currently maintained on normal saline 

at 125 mL an hour.  She is also maintained on an ACE inhibitor.  Patient is 

incontinent but has been voiding according to the nurse.  Oral intake is poor.  

She is noted to have an elevated d-dimer and is scheduled to go for a VQ scan 

today.  She does have history of diabetes mellitus.  I don't see any NSAIDs and 

her home medications.





Vital signs are stable.


General: The patient appeared well nourished and normally developed. 


HEENT: Head exam is unremarkable. Neck is without jugular venous distension.


LUNGS: Lungs are clear to auscultation and percussion. Breath sounds decreased.


HEART: Irregular rate and rhythm.


ABDOMEN: Abdominal exam reveals normal bowel sounds. Non-tender and non-

distended. No evidence of peritonitis.


EXTREMITITES: No clubbing, cyanosis, or edema.





Past Medical History


Past Medical History: Cancer, Diabetes Mellitus, Hyperlipidemia, Hypertension


Additional Past Medical History / Comment(s): osteoporosis, rectal cancer


History of Any Multi-Drug Resistant Organisms: None Reported


Past Surgical History: Cholecystectomy, Hysterectomy, Orthopedic Surgery, 

Tonsillectomy


Past Anesthesia/Blood Transfusion Reactions: No Reported Reaction


Past Psychological History: No Psychological Hx Reported


Smoking Status: Never smoker


Past Alcohol Use History: None Reported


Past Drug Use History: None Reported





- Past Family History


  ** Mother


Family Medical History: No Reported History





  ** Father


Family Medical History: Myocardial Infarction (MI)





Medications and Allergies


 Home Medications











 Medication  Instructions  Recorded  Confirmed  Type


 


Metoprolol Succinate 12.5 mg PO QAM 03/21/16 06/22/18 History


 


sitaGLIPtin [Januvia] 100 mg PO DAILY 03/20/18 06/22/18 History


 


Pravastatin Sodium [Pravachol] 40 mg PO HS 04/13/18 06/22/18 History


 


Ondansetron [Zofran ODT] 4 mg PO Q8HR PRN 06/22/18 06/22/18 History


 


Prochlorperazine [Compazine] 10 mg PO DAILY PRN 06/22/18 06/22/18 History


 


Acetaminophen Oral Susp [Tylenol] 960 mg PO TID  cup 06/26/18  Rx


 


Aspirin 81 mg PO DAILY  chew 06/26/18  Rx


 


INSULIN LISPRO (HumaLOG) [humaLOG] 0 unit SQ ACHS #1 vial 06/26/18  Rx


 


Isosorbide Mononitrate ER [Imdur] 15 mg PO DAILY  dose 06/26/18  Rx


 


Lidocaine Viscous [Xylocaine 30 ml PO TID  ml 06/26/18  Rx





Viscous 2%]    


 


Lisinopril [Zestril] 40 mg PO DAILY  tab 06/26/18  Rx


 


Loperamide [Imodium] 2 mg PO TID #20 cap 06/26/18  Rx


 


Mag Hydrox/Al Hydrox/Simeth 30 ml PO TID  cup 06/26/18  Rx





[Maalox]    


 


Pantoprazole [Protonix] 40 mg PO AC-BRKFST  tablet. 06/26/18  Rx


 


predniSONE 10 mg PO AS DIRECTED #30 tab 06/26/18  Rx


 


Cephalexin [Keflex] 250 mg PO QID #20 cap 07/03/18  Rx


 


Insulin Aspart [NovoLOG 0 unit SQ ACHS  vial 07/03/18  Rx





(formulary)]    


 


Petrolatum, White [Aquaphor] 1 applic TOPICAL BID PRN  applic 07/03/18  Rx


 


Psyllium Husk 100% [Metamucil 6 gm PO BID  packet 07/03/18  Rx





Packet]    











 Allergies











Allergy/AdvReac Type Severity Reaction Status Date / Time


 


ciprofloxacin [From Cipro] AdvReac  Blackout Verified 06/22/18 16:17


 


ciprofloxacin HCl AdvReac  Blackout Verified 06/22/18 16:17





[From Cipro]     














Physical Exam


Vitals: 


 Vital Signs











  Temp Pulse Resp BP Pulse Ox


 


 07/06/18 05:47  96.8 F L    


 


 07/06/18 05:44   80  18  104/57  98


 


 07/06/18 03:52   92  18  94/52  95


 


 07/06/18 01:38    102 H  93/52  95


 


 07/06/18 00:40  98.1 F  104 H  18  98/56  100








 Intake and Output











 07/05/18 07/06/18 07/06/18





 22:59 06:59 14:59


 


Other:   


 


  Weight  81.647 kg 














Results





- Lab Results


 Most recent lab results











Calcium  7.9 mg/dL (8.4-10.2)  L  07/06/18  01:15    














 07/06/18 01:15





 07/06/18 01:15





Assessment and Plan


Plan: 





Assessment:


1.  Nonoliguric acute kidney injury secondary to ATN secondary to hypotension 

and hemodynamic instability.  Creatinine 2 on admission.  Trace proteinuria 

noted on UA.  Baseline creatinine is near 1.


2.  Atrial fibrillation with RVR.  


3.  Hypotension secondary to A. fib and intravascular volume depletion.


4.  Lactic acidosis secondary to hypotension and volume depletion.


5.  Elevated d-dimer.  Scheduled for VQ scan today. Currently maintained on 

heparin drip.


6.  Insulin-dependent diabetes mellitus.





Plan:


Continue normal saline at 125 mL an hour.


Check renal ultrasound.


Check bladder scan.


Avoid nephrotoxic agents and hypotensive episodes.


Hold lisinopril for now.


Follow-up VQ scan.


Cardiology consulted.  Cardizem drip to be initiated.


Repeat electrolytes in the morning.





Thank you for the consultation.  I will continue to follow the patient with you 

during her hospital stay.

## 2018-07-06 NOTE — XR
EXAMINATION TYPE: XR chest 1V portable

 

DATE OF EXAM: 7/6/2018

 

COMPARISON: 6/24/2018

 

HISTORY: Short of breath

 

TECHNIQUE: Single frontal view of the chest is obtained.

 

FINDINGS:  There is poor separation and linear density at the lung bases. There is no gross heart gloria
lure. There are chest leads. There is no pleural effusion.

 

IMPRESSION:  Bilateral patchy atelectasis at the lung bases. Poor aspiration. Inspiration is much les
s than old exam. No heart failure seen.

## 2018-07-06 NOTE — HP
HISTORY AND PHYSICAL



HISTORY AND PHYSICAL AND DISCHARGE SUMMARY:



DATE OF ADMISSION:

2018.



DATE PATIENT :

2018.



CAUSE OF DEATH:

Aspiration pneumonia.



OTHER MEDICAL DIAGNOSES:

1. 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

2. Metabolic acidosis.

3. Diabetes mellitus, type 2, on oral hypoglycemic.

4. Essential hypertension.

5. Obesity; body mass index 32.

6. Hypoalbuminemia, possibly acute phase reactant.

7. Medical debility.

8. Severe metabolic acidosis.

9. Acute renal failure, probably prerenal.

10.Acute lactic acidosis.



PRESENTING COMPLAINT:

Short of breath.



HISTORY OF PRESENTING COMPLAINT/HOSPITAL COURSE:

This was a patient who was discharged from the hospital on 7/3/2018. The patient had

received a round of chemotherapy for rectal adenocarcinoma and developed severe

mucositis and diarrhea and was discharged with the same. Patient presented with some

shortness of breath.  There was a question about aspiration.  Patient did go down for a

radiological study. Patient got more short of breath.  Patient went into respiratory

distress and patient had to be intubated.  The patient was put on epinephrine requiring

100% oxygen.  Patient's CODE STATUS was made DO NOT RESUSCITATE. Later in the afternoon

Dr. Curry from Critical Care spoke to the family and patient was terminally weaned.

Patient succumbed to underlying problems.



REVIEW OF SYSTEMS:

Could not be done.  Patient was intubated.



PAST MEDICAL HISTORY:

1. Mucositis secondary to chemotherapy.

2. Bicytopenia.

3. 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

4. Metabolic acidosis.

5. Diabetes mellitus, type 2.

6. Essential hypertension.

7. Obesity.

8. Medical debility.



PAST SURGICAL HISTORY:

1. Cholecystectomy.

2. Hysterectomy.

3. Orthopedic surgery.

4. Tonsillectomy.



HOME MEDICATIONS:

1. Januvia 100 mg a day.

2. Prednisone taper.

3. Metamucil 6 grams p.o. b.i.d.

4. Compazine 10 mg p.o. daily p.r.n.

5. Pravachol 40 mg at bedtime.

6. Aquaphor application topically b.i.d. p.r.n.

7. Protonix 40 mg with breakfast.

8. Zofran 4 mg q.8 p.r.n.

9. Adult Fleet 133 mL rectally daily p.r.n.

10.Metoprolol 12.5 p.o. daily.

11.Milk of magnesia 2400 mg daily p.r.n.

12.Maalox 30 mL p.o. t.i.d.

13.Imodium 2 mg p.o. t.i.d.

14.Zestril 40 mg p.o. daily.

15.Xylocaine viscous 2% 30 mL p.o. t.i.d.

16.Imdur ER 15 mg p.o. daily.

17.Humalog per scale.

18.Keflex 250 mg p.o. q.i.d.

19.Dulcolax 10 mg rectally daily p.r.n.

20.Aspirin 81 mg p.o. daily.

21.Tylenol 960 mg p.o. t.i.d.



ALLERGIES:

CIPRO.



PHYSICAL EXAMINATION:

Afebrile. Pulse 105, respiration 16, blood pressure 111/40, pulse ox 91% on ventilator.

GENERAL APPEARANCE:  Well built; BMI 30.9. Lying in bed, intubated.

EYES: Pupils equal. Conjunctivae normal.

HEENT: External appearance of nose and ears normal. Oral cavity dry mucous membrane.

NECK: JVD not raised.  Mass not palpable.

RESPIRATORY: Effort _____

LUNGS: Diminished breath sounds.

CARDIOVASCULAR: First and second sounds normal. No edema.

ABDOMEN: Soft, non-tender. Liver and spleen not palpable.

LYMPHATIC: No lymph node palpable in neck or axillae.

PSYCHIATRY: Unable to assess.

NEUROLOGICAL:  Pupils equal. Plantars equivocal.



ADMISSION LABS:

White count 5.6, hemoglobin 12.0, potassium 3.5. BUN 83, creatinine 2.0, troponin I

0.1, 0.1. Albumin 2.3.



Chest x-ray showed bilateral patchy atelectasis.



DISPOSITION:

Patient .



This is a history and physical and a discharge summary on this patient.



NOTE: I had seen this patient earlier today and patient  late in the afternoon.





MMODL / IJN: [id number] / Job#: [id number]

## 2018-07-06 NOTE — CONS
CONSULTATION



CHIEF COMPLAINT:

Atrial fibrillation.



This is a 78-year-old lady with history of metastatic colon cancer, diabetes,

hypertension, dyslipidemia, who is admitted to hospital from [facility] with symptoms of

dyspnea.  She apparently has had an episode of cough and there is a question of

aspiration.  She came to the ER, where she was found to have elevated D-dimer and

admitted to [facility] care where she developed atrial fibrillation with rapid

ventricular rate for which we were consulted.  Patient was in the nuclear medicine

department for a V/Q scan and developed a cardiac arrest.  She was resuscitated,

intubated and is currently admitted to ICU.  At the time of my evaluation, she is

intubated, actually currently is in sinus rhythm.  She is on large dose of Levophed and

remains hypotensive.



PAST MEDICAL HISTORY:

Significant for colon cancer, diabetes and hypertension.



MEDICATIONS:

Medications include Januvia, Compazine, Pravachol, Protonix, metoprolol, Maalox,

Imodium, Zestril, Imdur, Humalog/



ALLERGIES:

Allergic to CIPRO.



Family history, social history, review of systems, I am unable to obtain from the

patient who is intubated on vent and unresponsive.



PHYSICAL EXAMINATION:

On exam, heart rate is 80 beats per minute.  The patient is in sinus. Blood pressure we

are not able to obtain any on large dose of Levophed.

Chest exam reveals diminished air entry bilaterally.

Heart exam reveals first and second heart sounds.  No gallop.

Abdomen is soft.

Examination of extremities did not reveal any edema.  Peripheral pulses are diminished.



An echocardiogram on this admission revealed moderate to severe LV dysfunction with an

ejection fraction of 35% to 40%.



LABS:

Labs show that the blood gases show a pH of 7.1, pO2 of 67, pCO2 of 36. Hemoglobin is

12. Platelet count is 198. Potassium is 3.5.  BUN is 83, creatinine is 2.  Troponins

are mildly elevated at 0.1 and 0.1.  The D-dimer is elevated.



ASSESSMENT:

1. Paroxysmal atrial fibrillation, currently in sinus rhythm, status post cardiac

    arrest.

2. Elevated D-dimer, rule out pulmonary embolism.

3. History of colon cancer.

4. Profound hypotension.



PLAN:

Continue with supportive care.  Troponin does not require any further workup.  The

patient is in sinus rhythm.  Prognosis guarded.  We will follow with you.





MMODL / IJN: [id number] / Job#: [id number]

## 2018-07-06 NOTE — XR
EXAMINATION TYPE: XR chest 1V portable

 

DATE OF EXAM: 7/6/2018

 

Comparison: 7/6/2018

 

Clinical History: 78-year-old female ET tube placement and OG tube placement

 

Findings:

Low lung volumes with bibasilar opacities, increased on the left. Panniculus projects over the thorax
. ET tube tip is satisfactory. Elongation/ectasia of the thoracic aorta. Lordotic positioning. Limits
 visualization of the lung bases. OG tube not seen.

 

 

Impression:

 

1. OG tube not seen.

2. ET tube satisfactory.

3. Exam limitations secondary to lordotic positioning, hypoventilatory changes, and panniculus projec
ting over the thorax. #3 patchy bibasilar densities, increasing at the left base, probably representi
ng atelectasis. Correlate to exclude infiltrates.

## 2018-07-06 NOTE — P.CNPUL
History of Present Illness


Consult date: 07/06/18


Reason for consult: dyspnea, hypoxemia, abnormal CXR/CT, other


Chief complaint: Respiratory failure, rectal carcinoma


History of present illness: 





Pulmonary consult dated 07/06/2018





78-year-old female with a history of rectal carcinoma.  She was here about 2 

weeks recently.  She was here from June 22 to July 3.  She was discharged to 

nursing home.  At the home,she apparently had an episode of possible choking or 

aspiration.  For that reason, she was sent back into the emergency room and she 

was evaluated there on July 6.  She apparently was admitted with a diagnosis of 

possible aspiration or aspiration pneumonia.  Today while she was in nuclear 

medicine, she apparently again had an episode of possible aspiration.  She 

apparently developed respiratory distress and a code was called.  She was 

intubated.  She had about 15 minutes of cardiopulmonary resuscitation.  She 

received a couple rounds of epinephrine.  In addition she had chest 

compressions and she did apparently did get back a pulse and blood pressure.  

The patient was transferred up to the ICU.  It's shortly thereafter or during 

the code, she was apparently made a no code/DO NOT RESUSCITATE.  I just spent 

about 30 minutes talking to the family including the 's son 2 daughters 

and some grandkids that the patient would not have ever wanted to be on life 

support and would not to be resuscitated.  The family agreed that the patient 

should be taken off of life support at this time and just maybe just be made 

comfortable.  The nurse was in the room when I had this conversation and can 

confirm this conversation.  This patient has a history of rectal carcinoma 

hyperlipidemia hypertension osteoporosis and diabetes.





Review of Systems


ROS unobtainable: due to endotracheal tube





Past Medical History


Past Medical History: Cancer, Diabetes Mellitus, Hyperlipidemia, Hypertension


Additional Past Medical History / Comment(s): osteoporosis, rectal cancer


History of Any Multi-Drug Resistant Organisms: None Reported


Past Surgical History: Cholecystectomy, Hysterectomy, Orthopedic Surgery, 

Tonsillectomy


Past Anesthesia/Blood Transfusion Reactions: No Reported Reaction


Past Psychological History: No Psychological Hx Reported


Smoking Status: Never smoker


Past Alcohol Use History: None Reported


Past Drug Use History: None Reported





- Past Family History


  ** Mother


Family Medical History: No Reported History





  ** Father


Family Medical History: Myocardial Infarction (MI)





Medications and Allergies


 Home Medications











 Medication  Instructions  Recorded  Confirmed  Type


 


Metoprolol Succinate 12.5 mg PO QAM 03/21/16 07/06/18 History


 


sitaGLIPtin [Januvia] 100 mg PO DAILY 03/20/18 07/06/18 History


 


Pravastatin Sodium [Pravachol] 40 mg PO HS 04/13/18 07/06/18 History


 


Ondansetron [Zofran ODT] 4 mg PO Q8HR PRN 06/22/18 07/06/18 History


 


Prochlorperazine [Compazine] 10 mg PO DAILY PRN 06/22/18 07/06/18 History


 


Acetaminophen Oral Susp [Tylenol] 960 mg PO TID  cup 06/26/18 07/06/18 Rx


 


Aspirin 81 mg PO DAILY  chew 06/26/18 07/06/18 Rx


 


Isosorbide Mononitrate ER [Imdur] 15 mg PO DAILY  dose 06/26/18 07/06/18 Rx


 


Lidocaine Viscous [Xylocaine 30 ml PO TID  ml 06/26/18 07/06/18 Rx





Viscous 2%]    


 


Lisinopril [Zestril] 40 mg PO DAILY  tab 06/26/18 07/06/18 Rx


 


Loperamide [Imodium] 2 mg PO TID #20 cap 06/26/18 07/06/18 Rx


 


Mag Hydrox/Al Hydrox/Simeth 30 ml PO TID  cup 06/26/18 07/06/18 Rx





[Maalox]    


 


Pantoprazole [Protonix] 40 mg PO AC-BRKFST  tablet. 06/26/18 07/06/18 Rx


 


Cephalexin [Keflex] 250 mg PO QID #20 cap 07/03/18 07/06/18 Rx


 


Petrolatum, White [Aquaphor] 1 applic TOPICAL BID PRN  applic 07/03/18 07/06/18 

Rx


 


Psyllium Husk 100% [Metamucil 6 gm PO BID  packet 07/03/18 07/06/18 Rx





Packet]    


 


Bisacodyl [Dulcolax] 10 mg RECTAL DAILY PRN 07/06/18 07/06/18 History


 


INSULIN LISPRO (HumaLOG) [humaLOG] See Protocol SQ ACHS 07/06/18 07/06/18 

History


 


Magnesium Hydroxide [Milk of 2,400 mg PO DAILY PRN 07/06/18 07/06/18 History





Magnesia]    


 


Na Phos,M-B/Na Phos,Di-Ba [Fleet 133 ml RECTAL DAILY PRN 07/06/18 07/06/18 

History





Adult]    


 


predniSONE See Taper PO AS DIRECTED 07/06/18 07/06/18 History











 Allergies











Allergy/AdvReac Type Severity Reaction Status Date / Time


 


ciprofloxacin [From Cipro] AdvReac  Blackout Verified 06/22/18 16:17


 


ciprofloxacin HCl AdvReac  Blackout Verified 06/22/18 16:17





[From Cipro]     














Physical Exam


Osteopathic Statement: *.  No significant issues noted on an osteopathic 

structural exam other than those noted in the History and Physical/Consult.


Vitals: 


 Vital Signs











  Temp Pulse Pulse Resp BP BP Pulse Ox


 


 07/06/18 09:40    140 H  16   111/40  91 L


 


 07/06/18 07:45    160 H  16   103/86  92 L


 


 07/06/18 06:55  97.1 F L   120 H  20   102/56  96


 


 07/06/18 06:35    125 H  20   


 


 07/06/18 05:47  96.8 F L      


 


 07/06/18 05:44   80   18  104/57   98


 


 07/06/18 03:52   92   18  94/52   95


 


 07/06/18 01:38     102 H  93/52   95


 


 07/06/18 00:40  98.1 F  104 H   18  98/56   100








 Intake and Output











 07/05/18 07/06/18 07/06/18





 22:59 06:59 14:59


 


Other:   


 


  Weight  81.647 kg 














The patient is very tachypnea.  She has an orally placed endotracheal tube.  

Looks to be in some respiratory distress.





HEENT examination is grossly unremarkable.  Mucous membranes are moist.  Teeth 

are in poor repair.





Neck supple.  Full range of motion.  No adenopathy thyromegaly or neck vein 

distention.





Cardiovascular examination reveals regular rhythm rate.  S1-S2 normal.  No S3 

or S4.  Patient is tachycardic.





Lungs reveal coarse bilateral rhonchi.  Breath sounds equal bilaterally.  No 

wheezes.  No crackles.





Abdomen soft without bowel sounds.  No masses or tenderness.





Extremities are cool.  Pulses are faint.  No clubbing.





Skin is without rash or lesion.





Neurologic examination cannot be assessed





Results





- Laboratory Findings


CBC and BMP: 


 07/06/18 01:15





 07/06/18 01:15


ABG











ABG pH  7.16  (7.35-7.45)  L*  07/06/18  11:41    


 


ABG pCO2  36 mmHg (35-45)   07/06/18  11:41    


 


ABG pO2  67 mmHg ()  L  07/06/18  11:41    


 


ABG O2 Saturation  83.9 % (94-97)  L  07/06/18  11:41    





PT/INR, D-dimer











PT  13.8 sec (9.0-12.0)  H  07/06/18  01:15    


 


INR  1.5  (<1.2)  H  07/06/18  01:15    


 


D-Dimer  30.40 mg/L FEU (<0.60)  H  07/06/18  01:15    








Abnormal lab findings: 


 Abnormal Labs











  07/06/18 07/06/18 07/06/18





  01:15 01:15 01:15


 


RDW    17.8 H


 


Lymphocytes # (Manual)    0.50 L


 


PT  13.8 H  


 


INR  1.5 H  


 


D-Dimer  30.40 H  


 


ABG pH   


 


ABG pO2   


 


ABG HCO3   


 


ABG Total CO2   


 


ABG O2 Saturation   


 


BUN   


 


Creatinine   


 


Glucose   


 


POC Glucose (mg/dL)   


 


Plasma Lactic Acid Aron   


 


Calcium   


 


CK-MB (CK-2)   3.6 H* 


 


Troponin I   0.115 H* 


 


Total Protein   


 


Albumin   


 


Urine Appearance   


 


Urine Protein   


 


Urine Glucose (UA)   


 


Urine Bilirubin   


 


Ur Leukocyte Esterase   


 


Amorphous Sediment   


 


Urine Mucus   














  07/06/18 07/06/18 07/06/18





  01:15 04:40 04:40


 


RDW   


 


Lymphocytes # (Manual)   


 


PT   


 


INR   


 


D-Dimer   


 


ABG pH   


 


ABG pO2   


 


ABG HCO3   


 


ABG Total CO2   


 


ABG O2 Saturation   


 


BUN  83 H*  


 


Creatinine  2.00 H  


 


Glucose  164 H  


 


POC Glucose (mg/dL)   


 


Plasma Lactic Acid Aron   3.1 H* 


 


Calcium  7.9 L  


 


CK-MB (CK-2)   


 


Troponin I    0.131 H*


 


Total Protein  4.3 L  


 


Albumin  2.3 L  


 


Urine Appearance   


 


Urine Protein   


 


Urine Glucose (UA)   


 


Urine Bilirubin   


 


Ur Leukocyte Esterase   


 


Amorphous Sediment   


 


Urine Mucus   














  07/06/18 07/06/18 07/06/18





  04:45 07:09 08:38


 


RDW   


 


Lymphocytes # (Manual)   


 


PT   


 


INR   


 


D-Dimer   


 


ABG pH   


 


ABG pO2   


 


ABG HCO3   


 


ABG Total CO2   


 


ABG O2 Saturation   


 


BUN   


 


Creatinine   


 


Glucose   


 


POC Glucose (mg/dL)   183 H 


 


Plasma Lactic Acid Aron    2.8 H*


 


Calcium   


 


CK-MB (CK-2)   


 


Troponin I   


 


Total Protein   


 


Albumin   


 


Urine Appearance  Cloudy H  


 


Urine Protein  Trace H  


 


Urine Glucose (UA)  Trace H  


 


Urine Bilirubin  1+ H  


 


Ur Leukocyte Esterase  Trace H  


 


Amorphous Sediment  Occasional H  


 


Urine Mucus  Rare H  














  07/06/18 07/06/18





  10:51 11:41


 


RDW  


 


Lymphocytes # (Manual)  


 


PT  


 


INR  


 


D-Dimer  


 


ABG pH   7.16 L*


 


ABG pO2   67 L


 


ABG HCO3   13 L


 


ABG Total CO2   14 L


 


ABG O2 Saturation   83.9 L


 


BUN  


 


Creatinine  


 


Glucose  


 


POC Glucose (mg/dL)  219 H 


 


Plasma Lactic Acid Aron  


 


Calcium  


 


CK-MB (CK-2)  


 


Troponin I  


 


Total Protein  


 


Albumin  


 


Urine Appearance  


 


Urine Protein  


 


Urine Glucose (UA)  


 


Urine Bilirubin  


 


Ur Leukocyte Esterase  


 


Amorphous Sediment  


 


Urine Mucus  














- Diagnostic Findings


Chest x-ray: report reviewed (Labs x-rays a medications are all reviewed.), 

image reviewed





Assessment and Plan


Assessment: 





Assessment





Status post cardiopulmonary arrest with cardiopulmonary resuscitation and 

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 

aspiration/Mendelson syndrome





History of rectal carcinoma.





Recent 2 week hospitalization





History of diabetes mellitus





History of hyperlipidemia





History of hypertension





History of osteoporosis














Plan: 





Plan dated 07/06/2018





The patient's family made it very clear to me after discussing this with them 

for some time including  and son 2 daughters and grandchildren, but this 

patient just wanted to go to have an.  She mentioned this to them recently when 

she was at the nursing home after her last week hospitalization here.  She doesn

't did not want any additional interventions and certainly did not want 

resuscitation intubation mechanical ventilation or any heroic measures.  They'

re very precise about all of this.  I asked whether or not any Buddhism 

individual she would or should come to see her prior to removing her from my 

support and they said no.  They would like them removed from life support 

immediately.  They would like to be in the room.


Time with Patient: Greater than 30

## 2018-07-06 NOTE — ED
SOB HPI





- General


Chief Complaint: Shortness of Breath


Stated Complaint: CHRIS


Time Seen by Provider: 07/06/18 00:40


Source: patient, EMS


Mode of arrival: ambulatory


Limitations: altered mental status





- History of Present Illness


Initial Comments: 





This patient is 78-year-old woman sent from the nursing home after she had 

developed some shortness of breath.  The patient had reportedly had a coughing 

episode after taking something by mouth.  The patient on arrival is denying 

chest pain.  She does admit to cough, and some shortness of breath which she 

states is better with the oxygen.  The history is somewhat limited as she 

appears to be mildly delirious.


MD Complaint: shortness of breath


-: hour(s)


Improves With: oxygen


Worsens With: nothing


Known History Of: other (Cancer)


Associated Symptoms: cough


Treatments Prior to Arrival: oxygen, bronchodilator





- Related Data


Home Oxygen Therapy: No


 Home Medications











 Medication  Instructions  Recorded  Confirmed


 


Metoprolol Succinate 12.5 mg PO QAM 03/21/16 06/22/18


 


sitaGLIPtin [Januvia] 100 mg PO DAILY 03/20/18 06/22/18


 


Pravastatin Sodium [Pravachol] 40 mg PO HS 04/13/18 06/22/18


 


Ondansetron [Zofran ODT] 4 mg PO Q8HR PRN 06/22/18 06/22/18


 


Prochlorperazine [Compazine] 10 mg PO DAILY PRN 06/22/18 06/22/18








 Previous Rx's











 Medication  Instructions  Recorded


 


Acetaminophen Oral Susp [Tylenol] 960 mg PO TID  cup 06/26/18


 


Aspirin 81 mg PO DAILY  chew 06/26/18


 


INSULIN LISPRO (HumaLOG) [humaLOG] 0 unit SQ ACHS #1 vial 06/26/18


 


Isosorbide Mononitrate ER [Imdur] 15 mg PO DAILY  dose 06/26/18


 


Lidocaine Viscous [Xylocaine 30 ml PO TID  ml 06/26/18





Viscous 2%]  


 


Lisinopril [Zestril] 40 mg PO DAILY  tab 06/26/18


 


Loperamide [Imodium] 2 mg PO TID #20 cap 06/26/18


 


Mag Hydrox/Al Hydrox/Simeth 30 ml PO TID  cup 06/26/18





[Maalox]  


 


Pantoprazole [Protonix] 40 mg PO AC-BRKFST  tablet. 06/26/18


 


predniSONE 10 mg PO AS DIRECTED #30 tab 06/26/18


 


Cephalexin [Keflex] 250 mg PO QID #20 cap 07/03/18


 


Insulin Aspart [NovoLOG 0 unit SQ ACHS  vial 07/03/18





(formulary)]  


 


Petrolatum, White [Aquaphor] 1 applic TOPICAL BID PRN  applic 07/03/18


 


Psyllium Husk 100% [Metamucil 6 gm PO BID  packet 07/03/18





Packet]  











 Allergies











Allergy/AdvReac Type Severity Reaction Status Date / Time


 


ciprofloxacin [From Cipro] AdvReac  Blackout Verified 06/22/18 16:17


 


ciprofloxacin HCl AdvReac  Blackout Verified 06/22/18 16:17





[From Cipro]     














Review of Systems


ROS Statement: 


Those systems with pertinent positive or pertinent negative responses have been 

documented in the HPI.





ROS Other: All systems not noted in ROS Statement are negative.


Limitations: ROS unobtainable due to patients medical condition


Respiratory: Reports: cough, dyspnea.  Denies: hemoptysis


Cardiovascular: Denies: chest pain, syncope


Gastrointestinal: Denies: abdominal pain, vomiting


Musculoskeletal: Denies: back pain


Neurological: Denies: headache





Past Medical History


Past Medical History: Cancer, Diabetes Mellitus, Hyperlipidemia, Hypertension


Additional Past Medical History / Comment(s): osteoporosis, rectal cancer


History of Any Multi-Drug Resistant Organisms: None Reported


Past Surgical History: Cholecystectomy, Hysterectomy, Orthopedic Surgery, 

Tonsillectomy


Past Anesthesia/Blood Transfusion Reactions: No Reported Reaction


Past Psychological History: No Psychological Hx Reported


Smoking Status: Never smoker


Past Alcohol Use History: None Reported


Past Drug Use History: None Reported





- Past Family History


  ** Mother


Family Medical History: No Reported History





  ** Father


Family Medical History: Myocardial Infarction (MI)





General Exam


Limitations: no limitations


General appearance: alert, in no apparent distress


Head exam: Present: atraumatic, normocephalic


Eye exam: Present: normal appearance.  Absent: scleral icterus, conjunctival 

injection


ENT exam: Present: mucous membranes dry


Neck exam: Present: normal inspection.  Absent: tenderness


Respiratory exam: Present: rales (Bilateral bases), rhonchi (Scattered rhonchi)

.  Absent: respiratory distress, wheezes, stridor


Cardiovascular Exam: Present: normal rhythm, tachycardia, normal heart sounds.  

Absent: systolic murmur, diastolic murmur, rubs


GI/Abdominal exam: Present: soft.  Absent: distended, tenderness, guarding, 

rebound, mass


Extremities exam: Present: normal inspection, normal capillary refill.  Absent: 

pedal edema, calf tenderness


Back exam: Present: normal inspection.  Absent: CVA tenderness (R), CVA 

tenderness (L)


Neurological exam: Present: alert, CN II-XII intact.  Absent: motor sensory 

deficit


Skin exam: Present: warm, dry, intact, normal color.  Absent: rash





Course


 Vital Signs











  07/06/18 07/06/18 07/06/18





  00:40 01:38 03:52


 


Temperature 98.1 F  


 


Pulse Rate 104 H  92


 


Respiratory 18 102 H 18





Rate   


 


Blood Pressure 98/56 93/52 94/52


 


O2 Sat by Pulse 100 95 95





Oximetry   














  07/06/18 07/06/18





  05:44 05:47


 


Temperature  96.8 F L


 


Pulse Rate 80 


 


Respiratory 18 





Rate  


 


Blood Pressure 104/57 


 


O2 Sat by Pulse 98 





Oximetry  














Procedures





- Sepsis


  ** Sepsis Focused Exam #1


Sepsis Focused Exam Date: 07/06/18


Sepsis Focused Exam Time: 05:00


Sepsis Focused Exam Complete: Yes


Vital Signs & RN Notes Reviewed: Yes


Capillary Refill: < 2 Seconds: Fingers


Peripheral Pulses: Normal: Radial (R)


Skin Color: Normal for Patient


Respiratory Exam: rales, rhonchi


Cardiovascular Exam: normal rhythm, normal heart sounds





Medical Decision Making





- Medical Decision Making





This patient is 78-year-old woman presenting from the nursing home after what 

sounds like a choking episode.  She does have underlying history of a 

malignancy.


The patient clinically appears moderately dehydrated, and she is started on IV 

fluids.  Labs do reveal elevated BUN and creatinine.


Patient's chest x-ray does not show a clear infiltrate, but given the other 

findings she is started on IV antibiotics.


The d-dimer is elevated, but given the renal function, computed tomography scan 

cannot be obtained tonight, the patient is started on heparin empirically and 

IV fluid.  Patient will have either VQ scan or computed tomography scan to rule 

out PE performed in the morning.


Patient does meet sepsis criteria, blood pressure has responded to the fluid 

boluses, patient longer hypotensive at admission.





- Lab Data


Result diagrams: 


 07/06/18 01:15





 07/06/18 01:15


 Lab Results











  07/06/18 07/06/18 07/06/18 Range/Units





  01:15 01:15 01:15 


 


WBC    5.6  (3.8-10.6)  k/uL


 


RBC    4.05  (3.80-5.40)  m/uL


 


Hgb    12.0  (11.4-16.0)  gm/dL


 


Hct    38.2  (34.0-46.0)  %


 


MCV    94.1  (80.0-100.0)  fL


 


MCH    29.5  (25.0-35.0)  pg


 


MCHC    31.4  (31.0-37.0)  g/dL


 


RDW    17.8 H  (11.5-15.5)  %


 


Plt Count    198  (150-450)  k/uL


 


Neutrophils % (Manual)    77  %


 


Band Neutrophils %    9  %


 


Lymphocytes % (Manual)    9  %


 


Monocytes % (Manual)    6  %


 


Neutrophils # (Manual)    4.80  (1.3-7.7)  k/uL


 


Lymphocytes # (Manual)    0.50 L  (1.0-4.8)  k/uL


 


Monocytes # (Manual)    0.34  (0-1.0)  k/uL


 


Nucleated RBCs    0  (0-0)  /100 WBC


 


Manual Slide Review    Performed  


 


Toxic Vacuolation    Present  


 


Hypochromasia    Slight  


 


Poikilocytosis (manual    Present  


 


Anisocytosis    Slight  


 


Macrocytosis    Slight  


 


PT  13.8 H    (9.0-12.0)  sec


 


INR  1.5 H    (<1.2)  


 


APTT  24.1    (22.0-30.0)  sec


 


D-Dimer  30.40 H    (<0.60)  mg/L FEU


 


Sodium     (137-145)  mmol/L


 


Potassium     (3.5-5.1)  mmol/L


 


Chloride     ()  mmol/L


 


Carbon Dioxide     (22-30)  mmol/L


 


Anion Gap     mmol/L


 


BUN     (7-17)  mg/dL


 


Creatinine     (0.52-1.04)  mg/dL


 


Est GFR (CKD-EPI)AfAm     (>60 ml/min/1.73 sqM)  


 


Est GFR (CKD-EPI)NonAf     (>60 ml/min/1.73 sqM)  


 


Glucose     (74-99)  mg/dL


 


Calcium     (8.4-10.2)  mg/dL


 


Total Bilirubin     (0.2-1.3)  mg/dL


 


AST     (14-36)  U/L


 


ALT     (9-52)  U/L


 


Alkaline Phosphatase     ()  U/L


 


Total Creatine Kinase   64   ()  U/L


 


CK-MB (CK-2)   3.6 H*   (0.0-2.4)  ng/mL


 


CK-MB (CK-2) Rel Index   5.6   


 


Troponin I   0.115 H*   (0.000-0.034)  ng/mL


 


Total Protein     (6.3-8.2)  g/dL


 


Albumin     (3.5-5.0)  g/dL














  07/06/18 Range/Units





  01:15 


 


WBC   (3.8-10.6)  k/uL


 


RBC   (3.80-5.40)  m/uL


 


Hgb   (11.4-16.0)  gm/dL


 


Hct   (34.0-46.0)  %


 


MCV   (80.0-100.0)  fL


 


MCH   (25.0-35.0)  pg


 


MCHC   (31.0-37.0)  g/dL


 


RDW   (11.5-15.5)  %


 


Plt Count   (150-450)  k/uL


 


Neutrophils % (Manual)   %


 


Band Neutrophils %   %


 


Lymphocytes % (Manual)   %


 


Monocytes % (Manual)   %


 


Neutrophils # (Manual)   (1.3-7.7)  k/uL


 


Lymphocytes # (Manual)   (1.0-4.8)  k/uL


 


Monocytes # (Manual)   (0-1.0)  k/uL


 


Nucleated RBCs   (0-0)  /100 WBC


 


Manual Slide Review   


 


Toxic Vacuolation   


 


Hypochromasia   


 


Poikilocytosis (manual   


 


Anisocytosis   


 


Macrocytosis   


 


PT   (9.0-12.0)  sec


 


INR   (<1.2)  


 


APTT   (22.0-30.0)  sec


 


D-Dimer   (<0.60)  mg/L FEU


 


Sodium  144  (137-145)  mmol/L


 


Potassium  3.5  (3.5-5.1)  mmol/L


 


Chloride  102  ()  mmol/L


 


Carbon Dioxide  28  (22-30)  mmol/L


 


Anion Gap  14  mmol/L


 


BUN  83 H*  (7-17)  mg/dL


 


Creatinine  2.00 H  (0.52-1.04)  mg/dL


 


Est GFR (CKD-EPI)AfAm  27  (>60 ml/min/1.73 sqM)  


 


Est GFR (CKD-EPI)NonAf  23  (>60 ml/min/1.73 sqM)  


 


Glucose  164 H  (74-99)  mg/dL


 


Calcium  7.9 L  (8.4-10.2)  mg/dL


 


Total Bilirubin  0.3  (0.2-1.3)  mg/dL


 


AST  22  (14-36)  U/L


 


ALT  30  (9-52)  U/L


 


Alkaline Phosphatase  71  ()  U/L


 


Total Creatine Kinase   ()  U/L


 


CK-MB (CK-2)   (0.0-2.4)  ng/mL


 


CK-MB (CK-2) Rel Index   


 


Troponin I   (0.000-0.034)  ng/mL


 


Total Protein  4.3 L  (6.3-8.2)  g/dL


 


Albumin  2.3 L  (3.5-5.0)  g/dL














Disposition


Clinical Impression: 


 Acute renal failure, Dyspnea, Dehydration, Sepsis





Disposition: ADMITTED AS IP TO THIS hospitals


Condition: Serious

## 2018-07-12 NOTE — CDI
Last Revision, December 2017





Documentation Clarification Form



Date: 7/12/2018 12:00:00 AM

From: Kaylie Alexander

Phone: If you have a question about this query, please contact Chyna Pablo 
 at [phone] between 8am and 5pm.

MRN: [id number]

Admit Date: 7/6/2018 2:37:00 AM

Patient Name: Edna Everett

Visit Number: [id number]

Discharge Date:



ATTENTION: The Clinical Documentation Specialists (CDI) and [facility] Coding Staff 
appreciate your assistance in clarifying documentation. Please respond to the 
clarification below the line at the bottom and electronically sign. The CDI & 
[facility] Coding staff will review the response and follow-up if needed. Please note: 
Queries are made part of the Legal Health Record. If you have any questions, 
please contact the author of this message via ITS.



Dr. Simone Mclean:



Documentation and location in ER record "Sepsis focused exam.  Patient meets 
sepsis criteria."

History/Risk Factors: Aspiration pneumonia

Clinical Indicators:  hypotensive

WBC/Left Shift     5.6         

Lactic acid:  Patient has acidosis

Blood cultures: 

Vitals signs on admission: 98.1 F     104 bpm  18    98/56

Other Clinical Indicators:   Cardiac arrest

Treatment:  Fluid bolus, CPR, intubation

ID Consult:

Antibiotics:   IV

IV Bolus:  Yes

Other: 



In your professional opinion, please clarify if these findings signify one of 
the following conditions, whether the condition is POA, and cause, if known: 

Condition

Sepsis ruled out

SIRS, without underlying infectious process

Sepsis

Severe Sepsis 

Septic Shock

Other, please specify  _____________

Unable to determine



Present on Admission:

Yes

No



   





Sepsis ruled out
MTDD
